# Patient Record
Sex: FEMALE | Race: WHITE | Employment: FULL TIME | URBAN - METROPOLITAN AREA
[De-identification: names, ages, dates, MRNs, and addresses within clinical notes are randomized per-mention and may not be internally consistent; named-entity substitution may affect disease eponyms.]

---

## 2017-03-02 ENCOUNTER — HOSPITAL ENCOUNTER (OUTPATIENT)
Dept: MAMMOGRAPHY | Age: 37
Discharge: HOME | End: 2017-03-02
Attending: OBSTETRICS & GYNECOLOGY
Payer: COMMERCIAL

## 2017-03-02 DIAGNOSIS — Z80.3 FAMILY HISTORY OF BREAST CANCER: ICD-10-CM

## 2017-03-02 PROCEDURE — 77067 SCR MAMMO BI INCL CAD: CPT

## 2018-01-02 NOTE — THERAPY EVALUATION
Burgess Moser  : 1980  [facility]  Secondary:  [facility], [address], [address]  [phone]   [phone]          OUTPATIENT PHYSICAL THERAPY:Initial Assessment 1/3/2018    ICD-10: Treatment Diagnosis: Stress incontinence (female) (male) (N39.3)  Precautions/Allergies:   Pcn [penicillins]   Fall Risk Score: 0 (? 5 = High Risk)  MD Orders: eval and treat MEDICAL/REFERRING DIAGNOSIS:  OAB (overactive bladder) [N32.81]   DATE OF ONSET: 2 years  REFERRING PHYSICIAN: Danay Velasquez MD  RETURN PHYSICIAN APPOINTMENT: TBD      INITIAL ASSESSMENT:  Ms. Chelsea Duran 40year old female who has core and pelvic floor mm weakness consistant with stress incontinence. She presents with decreased pelvic floor strength and endurance of the PF mm. Her PERF = 3/5/8/NT. Pt participates in high level activities that require increased strength of the pelvic floor. Pt would benefit from skilled therapy to address these deficits for return to previous level of function. PROBLEM LIST (Impacting functional limitations):  1. Decreased strength of pelvic floor which limits bladder control INTERVENTIONS PLANNED:  1. Neuromuscular re-education  2. Biofeedback as needed  3. HEP  4. Bladder retraining  5. Bladder education  6. Electrical Stimulation  7. Home Exercise Program (HEP)  8. Neuromuscular Re-education/Strengthening  9. Range of Motion (ROM)  10. Therapeutic Activites  11. Therapeutic Exercise/Strengthening   TREATMENT PLAN:  Effective Dates: 2018 TO 2018  Frequency/Duration: 1 time a week for 12 weeks  GOALS: (Goals have been discussed and agreed upon with patient.)  Short-Term Functional Goals: Time Frame: in 3 weeks  1. Patient will demonstrate independence with home exercise program.  2. Patient will demonstrated good coordination of pelvic floor muscles to improve continence.    Discharge Goals: Time Frame: in 12 weeks  Pt will be able to participate in 30 min of aerobic activities without urinary incontinence. Pt will report a 75 % decrease in urinary incontinent episodes per voiding log in order to decrease social anxiety. Pt will demonstrate an increase in PFM endurance from 5 hold to 20 hold x 10 reps as measured by EMG within 6 weeks in order to improve PFM activity and thus decrease incontinence. Patient will demonstrate use of functional brace PFM contraction by performing a \"pelvic brace\" in order to eliminate UI during a jumping isabel  Rehabilitation Potential For Stated Goals: Good  Regarding Lucy Solano's therapy, I certify that the treatment plan above will be carried out by a therapist or under their direction. Thank you for this referral,  Rosa Merino PT     Referring Physician Signature: Dariel Hart MD              Date                    The information in this section was collected on 1/3/2018 (except where otherwise noted). HISTORY:   Present Symptom:  Pain Intensity 1: 0 working out at gym 3 x week. [place] with lifting toddler. History of Present Injury/Illness (Reason for Referral):  Pt states that she is having frequency and   Past Medical History/Comorbidities:   Ms. Alyssa Ferrer  has no past medical history on file. Ms. Alyssa Ferrer  has a past surgical history that includes hx wisdom teeth extraction. 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 Current Medications:    Current Outpatient Prescriptions:     PSEUDOEPHEDRINE-guaiFENesin (MUCINEX D)  mg per tablet, Take 1 Tab by mouth every twelve (12) hours. , Disp: , Rfl:     docusate sodium (COLACE) 50 mg capsule, Take 50 mg by mouth two (2) times a day., Disp: , Rfl:     TAYTULLA 1 mg-20 mcg (24)/75 mg (4) cap, Take 1 Caplet by mouth daily. , Disp: 1 Package, Rfl: 12    oxybutynin chloride XL (DITROPAN XL) 10 mg CR tablet, Take 1 Tab by mouth daily. , Disp: 30 Tab, Rfl: 11    Cetirizine (ZYRTEC) 10 mg cap, Take  by mouth., Disp: , Rfl:    Date Last Reviewed:  1/3/2018  Gynecological History:   · Number of pregnancies: 2, vaginal 2  Son is not two. · Episiotomy: 2x  Past Urinary Medical History:  Cyst on right ovary. Incontinence History:  PROBLEM: YES/NO: COMMENTS:   Loss of urine with coughing YES    Loss of urine with lifting  YES    Loss of urine with exercise, running YES    Loss of urine with strong urge NO    Loss of urine with approaching the bathroom NO    Loss of urine with key in lock NO    Loss of urine as getting to toilet/removing clothing NO    Loss of urine when hearing running water NO    Have difficulty initiating a urine stream NO    Have difficulty stopping urine stream YES    Have to strain to empty bladder NO    Dribble urine when urinating NO    Dribble urine after emptying bladder NO    Experience pain with urination NO    Experience burning during urination NO    Have blood in urine NO      Voiding Patterns:  Patient voids every 1 hours during the day and 2-3 times during the night. Patient reports that she empties bladder. Patient uses pads for bladder protection; she changes pads 0 times per day. Fluid Intake:  Patient drinks  60 oz of water per day. She consumes 20 oz of caffeinated beverages per day. Patient not limit fluid intake to control bladder. Bowel Habits:  Patient feels like she has an anal fissure. Has to take stool softener and Mirilax. Feels like she has a bowel movement daily or every other day. Mobility / Self Care: I   Personal / Social History:  · Sexually active? NO:   · Social activities restricted due to urinary incontinence? YES: Playing with children.        Number of Personal Factors/Comorbidities that affect the Plan of Care: 1-2: MODERATE COMPLEXITY   EXAMINATION:   External Observation:   · Voluntary Contraction: present Pt demonstrates increased use of accessory mm with pelvic floor contraction. · Voluntary Relaxation: present   · Involuntary Contraction: present  · Involuntary Relaxation: present  · Perineal Body Assessment: WNL  · Anal Dyer: present B  · Skin Integrity: WNL  · Q-tip Test: WNL  · Vaginal Vault Size: within normal limits    L[place]ock PERFECT (Power/Endurnace/Repetitions/Fast Twitch/Elevation/Co-contraction/Timing) Scale:   · Lacock PERFECT = 3/5/8/NT. · Tissue support test with bearing down:  Grade 1: not visible at introitus; Grade 2: visible at introitus; Grade 3: tissue outside introitus  · Anterior Wall = 1   · Posterior Wall = 0   · Apical = 0   · Palpation:  No pain with palpation         Body Structures Involved:  1. Muscles Body Functions Affected:  1. Genitourinary  2. Reproductive  3. Neuromusculoskeletal Activities and Participation Affected:  1. Self Care  2. Interpersonal Interactions and Relationships  3. Community, Social and Leon Corin[place]   Number of elements that affect the Plan of Care: 3: MODERATE COMPLEXITY   CLINICAL PRESENTATION:   Presentation: Evolving clinical presentation with changing clinical characteristics: MODERATE COMPLEXITY   CLINICAL DECISION MAKING:   Outcome Measure: Tool Used: Pelvic Floor Impact Questionnaire--short form 7 (PFIQ-7)   Score:  Initial:   · Bladder or Urine: 12  · Bowel or Rectum: 6  · Vagina or Pelvis: 4 Most Recent: X (Date: -- )  · Bladder or Urine: X  · Bowel or Rectum: X  · Vagina or Pelvis: X   Interpretation of Score: Each of the 7 sections is scored on a scale from 0-3; 0 representing \"Not at all\", 3 representing \"Quite a bit\". The mean value is taken from all the answered items, then multiplied by 100 to obtain the scale score, ranging from 0-100. This process is repeated for each column representing bowel, bladder, and pelvic pain. Medical Necessity:   · Patient is expected to demonstrate progress in coordination to decrease assistance required with Kegel.   · Patient demonstrates good rehab potential due to higher previous functional level. Reason for Services/Other Comments:  · Patient continues to require skilled intervention due to incontinence. Use of outcome tool(s) and clinical judgement create a POC that gives a: Questionable prediction of patient's progress: MODERATE COMPLEXITY   TREATMENT:   (In addition to Assessment/Re-Assessment sessions the following treatments were rendered)  Pre-treatment Symptoms/Complaints:  Stress incontinence  Pain: Initial:   Pain Intensity 1: 0 0/10 Post Session:  0/10     THERAPEUTIC EXERCISE: (20 minutes):  Exercises per grid below to improve mobility and coordination. Required minimal verbal and tactile cues to promote proper body breathing techniques. Progressed complexity of movement as indicated. Exercises:  Patient instructed in pelvic floor exercises listed below:   Date:  1/2/2018 Date:   Date:     Activity/Exercise Parameters Parameters Parameters   Pt education  10 min      Kegel in supine with tactile feedback    5on10 off x 10  1on1 off x 10        Small jumping jacks with kegel X 10                                   [employer] Portal    The following educational topics were reviewed with patient:  Bladder health,pelvic floor anatomy, how foods affect bladder, bladder retraining. Treatment/Session Assessment:    · Response to Treatment:   Pt reported good understanding of plan of care as well as exercises. All questions were answered and pt. Invited to call with any further questions or issues. · Compliance with Program/Exercises: Will assess as treatment progresses. · Recommendations/Intent for next treatment session: \"Next visit will focus on advancements to more challenging activities\".   Total Treatment Duration:  PT Patient Time In/Time Out  Time In: 0900  Time Out: 1000    Susy Hooks, PT

## 2018-01-03 ENCOUNTER — HOSPITAL ENCOUNTER (OUTPATIENT)
Dept: PHYSICAL THERAPY | Age: 38
Discharge: HOME | End: 2018-01-03
Attending: OBSTETRICS & GYNECOLOGY
Payer: COMMERCIAL

## 2018-01-03 DIAGNOSIS — N32.81 OAB (OVERACTIVE BLADDER): ICD-10-CM

## 2018-01-03 PROCEDURE — 97110 THERAPEUTIC EXERCISES: CPT

## 2018-01-03 PROCEDURE — 97161 PT EVAL LOW COMPLEX 20 MIN: CPT

## 2018-01-03 NOTE — PROGRESS NOTES
Ambulatory/Rehab Services H2 Model Falls Risk Assessment    Risk Factor Pts. ·   Confusion/Disorientation/Impulsivity  []    4 ·   Symptomatic Depression  []   2 ·   Altered Elimination  []   1 ·   Dizziness/Vertigo  []   1 ·   Gender (Male)  []   1 ·   Any administered antiepileptics (anticonvulsants):  []   2 ·   Any administered benzodiazepines:  []   1 ·   Visual Impairment (specify):  []   1 ·   Portable Oxygen Use  []   1 ·   Orthostatic ? BP  []   1 ·   History of Recent Falls (within 3 mos.)  []   5     Ability to Rise from Chair (choose one) Pts. ·   Ability to rise in a single movement  [x]   0 ·   Pushes up, successful in one attempt  []   1 ·   Multiple attempts, but successful  []   3 ·   Unable to rise without assistance  []   4   Total: (5 or greater = High Risk) 0     Falls Prevention Plan:   []                Physical Limitations to Exercise (specify):   []                Mobility Assistance Device (type):   []                Exercise/Equipment Adaptation (specify):    ©2010 [facility] [address] [place] [address] Patent #[id number].  Federal Law prohibits the replication, distribution or use without written permission from [facility] [employer]

## 2018-01-11 ENCOUNTER — HOSPITAL ENCOUNTER (OUTPATIENT)
Dept: PHYSICAL THERAPY | Age: 38
Discharge: HOME | End: 2018-01-11
Attending: OBSTETRICS & GYNECOLOGY
Payer: COMMERCIAL

## 2018-01-11 PROCEDURE — 97140 MANUAL THERAPY 1/> REGIONS: CPT

## 2018-01-11 PROCEDURE — 97110 THERAPEUTIC EXERCISES: CPT

## 2018-01-11 NOTE — PROGRESS NOTES
Tabitha Mort  : 1980  [facility]  Secondary:  [facility], [address] [address], [address] [facility]  [phone]   [phone]          OUTPATIENT PHYSICAL THERAPY:Daily Note 2018    ICD-10: Treatment Diagnosis: Stress incontinence (female) (male) (N39.3)  Precautions/Allergies:   Pcn [penicillins]   Fall Risk Score: 0 (? 5 = High Risk)  MD Orders: eval and treat MEDICAL/REFERRING DIAGNOSIS:  Overactive bladder [N32.81]   DATE OF ONSET: 2 years  REFERRING PHYSICIAN: Kiran Altamirano MD  RETURN PHYSICIAN APPOINTMENT: TBD       ASSESSMENT:  Ms. Jamey Queen 40year old female who has core and pelvic floor mm weakness consistant with stress incontinence. She presents with decreased pelvic floor strength and endurance of the PF mm. Her PERF = 3/5/8/NT. Pt participates in high level activities that require increased strength of the pelvic floor. Pt would benefit from skilled therapy to address these deficits for return to previous level of function. PROBLEM LIST (Impacting functional limitations):  1. Decreased strength of pelvic floor which limits bladder control INTERVENTIONS PLANNED:  1. Neuromuscular re-education  2. Biofeedback as needed  3. HEP  4. Bladder retraining  5. Bladder education  6. Electrical Stimulation  7. Home Exercise Program (HEP)  8. Neuromuscular Re-education/Strengthening  9. Range of Motion (ROM)  10. Therapeutic Activites  11. Therapeutic Exercise/Strengthening   TREATMENT PLAN:  Effective Dates: 2018 TO 2018  Frequency/Duration: 1 time a week for 12 weeks  GOALS: (Goals have been discussed and agreed upon with patient.)  Short-Term Functional Goals: Time Frame: in 3 weeks  1. Patient will demonstrate independence with home exercise program.  2. Patient will demonstrated good coordination of pelvic floor muscles to improve continence.    Discharge Goals: Time Frame: in 12 weeks  Pt will be able to participate in 30 min of aerobic activities without urinary incontinence. Pt will report a 75 % decrease in urinary incontinent episodes per voiding log in order to decrease social anxiety. Pt will demonstrate an increase in PFM endurance from 5 hold to 20 hold x 10 reps as measured by EMG within 6 weeks in order to improve PFM activity and thus decrease incontinence. Patient will demonstrate use of functional brace PFM contraction by performing a \"pelvic brace\" in order to eliminate UI during a jumping isabel  Rehabilitation Potential For Stated Goals: Good  Regarding Lucy Solano's therapy, I certify that the treatment plan above will be carried out by a therapist or under their direction. Thank you for this referral,  Jersey Mak, PT     Referring Physician Signature: Dom Angulo MD              Date                    The information in this section was collected on 1/3/2018 (except where otherwise noted). HISTORY:   Present Symptom:  Pain Intensity 1: 0 working out at gym 3 x week. [place] with lifting toddler. History of Present Injury/Illness (Reason for Referral):  Pt states that she is having frequency and   Past Medical History/Comorbidities:   Ms. Moses Flores  has no past medical history on file. Ms. Moses Flores  has a past surgical history that includes hx wisdom teeth extraction. 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 Current Medications:    Current Outpatient Prescriptions:     PSEUDOEPHEDRINE-guaiFENesin (MUCINEX D)  mg per tablet, Take 1 Tab by mouth every twelve (12) hours. , Disp: , Rfl:     docusate sodium (COLACE) 50 mg capsule, Take 50 mg by mouth two (2) times a day., Disp: , Rfl:     TAYTULLA 1 mg-20 mcg (24)/75 mg (4) cap, Take 1 Caplet by mouth daily. , Disp: 1 Package, Rfl: 12    oxybutynin chloride XL (DITROPAN XL) 10 mg CR tablet, Take 1 Tab by mouth daily. , Disp: 30 Tab, Rfl: 11    Cetirizine (ZYRTEC) 10 mg cap, Take  by mouth., Disp: , Rfl:    Date Last Reviewed:  1/11/2018  Gynecological History:   · Number of pregnancies: 2, vaginal 2  Son is not two. · Episiotomy: 2x  Past Urinary Medical History:  Cyst on right ovary. Incontinence History:  PROBLEM: YES/NO: COMMENTS:   Loss of urine with coughing YES    Loss of urine with lifting  YES    Loss of urine with exercise, running YES    Loss of urine with strong urge NO    Loss of urine with approaching the bathroom NO    Loss of urine with key in lock NO    Loss of urine as getting to toilet/removing clothing NO    Loss of urine when hearing running water NO    Have difficulty initiating a urine stream NO    Have difficulty stopping urine stream YES    Have to strain to empty bladder NO    Dribble urine when urinating NO    Dribble urine after emptying bladder NO    Experience pain with urination NO    Experience burning during urination NO    Have blood in urine NO      Voiding Patterns:  Patient voids every 1 hours during the day and 2-3 times during the night. Patient reports that she empties bladder. Patient uses pads for bladder protection; she changes pads 0 times per day. Fluid Intake:  Patient drinks  60 oz of water per day. She consumes 20 oz of caffeinated beverages per day. Patient not limit fluid intake to control bladder. Bowel Habits:  Patient feels like she has an anal fissure. Has to take stool softener and Mirilax. Feels like she has a bowel movement daily or every other day. Mobility / Self Care: I   Personal / Social History:  · Sexually active? NO:   · Social activities restricted due to urinary incontinence? YES: Playing with children.        Number of Personal Factors/Comorbidities that affect the Plan of Care: 1-2: MODERATE COMPLEXITY   EXAMINATION:   External Observation:   · Voluntary Contraction: present Pt demonstrates increased use of accessory mm with pelvic floor contraction. · Voluntary Relaxation: present   · Involuntary Contraction: present  · Involuntary Relaxation: present  · Perineal Body Assessment: WNL  · Anal Boelus: present B  · Skin Integrity: WNL  · Q-tip Test: WNL  · Vaginal Vault Size: within normal limits    Lacock PERFECT (Power/Endurnace/Repetitions/Fast Twitch/Elevation/Co-contraction/Timing) Scale:   · [place]ock PERFECT = 3/5/8/NT. · Tissue support test with bearing down:  Grade 1: not visible at introitus; Grade 2: visible at introitus; Grade 3: tissue outside introitus  · Anterior Wall = 1   · Posterior Wall = 0   · Apical = 0   · Palpation:  No pain with palpation. 1/11/2018  Increased tone left LA and OI  SEMG evaluation:   Date: 1/11/2018    Resting Tone: 10 uV   Quality of Resting Tone: elevated   5 Second Hold: 15 uV   10 Second Hold: 15 uV   Quality of Recruitment: Fair    Quality of Relaxation: Poor    Quality of Holding: Fair    Stability of Hold: Fair    Stability of Rest: Poor   ·         Body Structures Involved:  1. Muscles Body Functions Affected:  1. Genitourinary  2. Reproductive  3. Neuromusculoskeletal Activities and Participation Affected:  1. Self Care  2. Interpersonal Interactions and Relationships  3. Community, Social and [place]   Number of elements that affect the Plan of Care: 3: MODERATE COMPLEXITY   CLINICAL PRESENTATION:   Presentation: Evolving clinical presentation with changing clinical characteristics: MODERATE COMPLEXITY   CLINICAL DECISION MAKING:   Outcome Measure: Tool Used: Pelvic Floor Impact Questionnaire--short form 7 (PFIQ-7)   Score:  Initial:   · Bladder or Urine: 12  · Bowel or Rectum: 6  · Vagina or Pelvis: 4 Most Recent: X (Date: -- )  · Bladder or Urine: X  · Bowel or Rectum: X  · Vagina or Pelvis: X   Interpretation of Score: Each of the 7 sections is scored on a scale from 0-3; 0 representing \"Not at all\", 3 representing \"Quite a bit\".  The mean value is taken from all the answered items, then multiplied by 100 to obtain the scale score, ranging from 0-100. This process is repeated for each column representing bowel, bladder, and pelvic pain. Medical Necessity:   · Patient is expected to demonstrate progress in coordination to decrease assistance required with Kegel. · Patient demonstrates good rehab potential due to higher previous functional level. Reason for Services/Other Comments:  · Patient continues to require skilled intervention due to incontinence. Use of outcome tool(s) and clinical judgement create a POC that gives a: Questionable prediction of patient's progress: MODERATE COMPLEXITY   TREATMENT:   (In addition to Assessment/Re-Assessment sessions the following treatments were rendered)  Pre-treatment Symptoms/Complaints:  Pt states that she has been doing her exercises. No change at this time. Pain: Initial:   Pain Intensity 1: 0 0/10 Post Session:  0/10     THERAPEUTIC EXERCISE: (35 minutes):  Exercises per grid below to improve mobility and coordination. Required minimal verbal and tactile cues to promote proper body breathing techniques. Progressed complexity of movement as indicated. MANUAL THERAPY: (15 minutes): Soft tissue mobilization was utilized and necessary because of the patient's restricted motion of soft tissue. SCS and Trigger point to bilateral LA, OI, and perineum to reduce tone and improve tissue elasticity.    Left has increased tightness compared to the right  Exercises:  Patient instructed in pelvic floor exercises listed below:   Date:  1/2/2018 Date:  1/11/2018 Date:     Activity/Exercise Parameters Parameters Parameters   Pt education  10 min      Kegel in supine with tactile feedback    5on10 off x 10  1on1 off x 10    5on10 off x 10  1on1 off x 10     Small jumping jacks with kegel X 10           Kegel in supine with biofeedback assist for visual feedback via vaginal internal sensor    Focus on resting tone and hold contractions 10 min total     NMES with Kegel   50 HZ 5on10 off 6 min       Piriformis stretch  3 x 30 sec    Adductor stretch  3 x 30 sec    Jan stretch  3 x 30 sec          [employer] Portal    The following educational topics were reviewed with patient:  Treatment/Session Assessment:    · Response to Treatment:  Pt continued to have a high resting tone of 10 uV after soft tissue and relaxation techniques. Will continue to work on relaxation techniques and stretching to improve resting tone. · Compliance with Program/Exercises: Will assess as treatment progresses. · Recommendations/Intent for next treatment session: \"Next visit will focus on advancements to more challenging activities\".   Total Treatment Duration:  PT Patient Time In/Time Out  Time In: 0900  Time Out: 1000    Kyra Jimenes PT

## 2018-01-17 ENCOUNTER — HOSPITAL ENCOUNTER (OUTPATIENT)
Dept: PHYSICAL THERAPY | Age: 38
End: 2018-01-17
Attending: OBSTETRICS & GYNECOLOGY
Payer: COMMERCIAL

## 2018-01-18 ENCOUNTER — APPOINTMENT (OUTPATIENT)
Dept: PHYSICAL THERAPY | Age: 38
End: 2018-01-18
Attending: OBSTETRICS & GYNECOLOGY
Payer: COMMERCIAL

## 2018-01-25 ENCOUNTER — HOSPITAL ENCOUNTER (OUTPATIENT)
Dept: PHYSICAL THERAPY | Age: 38
Discharge: HOME | End: 2018-01-25
Attending: OBSTETRICS & GYNECOLOGY
Payer: COMMERCIAL

## 2018-01-25 PROCEDURE — 97140 MANUAL THERAPY 1/> REGIONS: CPT

## 2018-01-25 PROCEDURE — 97110 THERAPEUTIC EXERCISES: CPT

## 2018-01-25 NOTE — PROGRESS NOTES
Betty Johnson  : 1980  [facility]  Secondary:  Therapy Center at [address], [address] [place] [address] [address], [address] [facility]  [phone]   [phone]          OUTPATIENT PHYSICAL THERAPY:Daily Note 2018    ICD-10: Treatment Diagnosis: Stress incontinence (female) (male) (N39.3)  Precautions/Allergies:   Pcn [penicillins]   Fall Risk Score: 0 (? 5 = High Risk)  MD Orders: eval and treat MEDICAL/REFERRING DIAGNOSIS:  Overactive bladder [N32.81]   DATE OF ONSET: 2 years  REFERRING PHYSICIAN: Taylor Jamil MD  RETURN PHYSICIAN APPOINTMENT: TBD       ASSESSMENT:  Ms. Mercy Holliday 40year old female who has core and pelvic floor mm weakness consistant with stress incontinence. She presents with decreased pelvic floor strength and endurance of the PF mm. Her PERF = 3/5/8/NT. Pt participates in high level activities that require increased strength of the pelvic floor. Pt would benefit from skilled therapy to address these deficits for return to previous level of function. PROBLEM LIST (Impacting functional limitations):  1. Decreased strength of pelvic floor which limits bladder control INTERVENTIONS PLANNED:  1. Neuromuscular re-education  2. Biofeedback as needed  3. HEP  4. Bladder retraining  5. Bladder education  6. Electrical Stimulation  7. Home Exercise Program (HEP)  8. Neuromuscular Re-education/Strengthening  9. Range of Motion (ROM)  10. Therapeutic Activites  11. Therapeutic Exercise/Strengthening   TREATMENT PLAN:  Effective Dates: 2018 TO 2018  Frequency/Duration: 1 time a week for 12 weeks  GOALS: (Goals have been discussed and agreed upon with patient.)  Short-Term Functional Goals: Time Frame: in 3 weeks  1. Patient will demonstrate independence with home exercise program.  2. Patient will demonstrated good coordination of pelvic floor muscles to improve continence.    Discharge Goals: Time Frame: in 12 weeks  Pt will be able to participate in 30 min of aerobic activities without urinary incontinence. Pt will report a 75 % decrease in urinary incontinent episodes per voiding log in order to decrease social anxiety. Pt will demonstrate an increase in PFM endurance from 5 hold to 20 hold x 10 reps as measured by EMG within 6 weeks in order to improve PFM activity and thus decrease incontinence. Patient will demonstrate use of functional brace PFM contraction by performing a \"pelvic brace\" in order to eliminate UI during a jumping isabel  Rehabilitation Potential For Stated Goals: Good  Regarding Lucy Solano's therapy, I certify that the treatment plan above will be carried out by a therapist or under their direction. Thank you for this referral,  Lisandro Blank, PT     Referring Physician Signature: Debra Jennings MD              Date                    The information in this section was collected on 1/3/2018 (except where otherwise noted). HISTORY:   Present Symptom:  Pain Intensity 1: 0 working out at gym 3 x week. [place] with lifting toddler. History of Present Injury/Illness (Reason for Referral):  Pt states that she is having frequency and   Past Medical History/Comorbidities:   Ms. Christian Manzano  has no past medical history on file. Ms. Christian Manzano  has a past surgical history that includes hx wisdom teeth extraction. 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 Current Medications:    Current Outpatient Prescriptions:     PSEUDOEPHEDRINE-guaiFENesin (MUCINEX D)  mg per tablet, Take 1 Tab by mouth every twelve (12) hours. , Disp: , Rfl:     docusate sodium (COLACE) 50 mg capsule, Take 50 mg by mouth two (2) times a day., Disp: , Rfl:     TAYTULLA 1 mg-20 mcg (24)/75 mg (4) cap, Take 1 Caplet by mouth daily. , Disp: 1 Package, Rfl: 12    oxybutynin chloride XL (DITROPAN XL) 10 mg CR tablet, Take 1 Tab by mouth daily. , Disp: 30 Tab, Rfl: 11    Cetirizine (ZYRTEC) 10 mg cap, Take  by mouth., Disp: , Rfl:    Date Last Reviewed:  1/25/2018  Gynecological History:   · Number of pregnancies: 2, vaginal 2  Son is not two. · Episiotomy: 2x  Past Urinary Medical History:  Cyst on right ovary. Incontinence History:  PROBLEM: YES/NO: COMMENTS:   Loss of urine with coughing YES    Loss of urine with lifting  YES    Loss of urine with exercise, running YES    Loss of urine with strong urge NO    Loss of urine with approaching the bathroom NO    Loss of urine with key in lock NO    Loss of urine as getting to toilet/removing clothing NO    Loss of urine when hearing running water NO    Have difficulty initiating a urine stream NO    Have difficulty stopping urine stream YES    Have to strain to empty bladder NO    Dribble urine when urinating NO    Dribble urine after emptying bladder NO    Experience pain with urination NO    Experience burning during urination NO    Have blood in urine NO      Voiding Patterns:  Patient voids every 1 hours during the day and 2-3 times during the night. Patient reports that she empties bladder. Patient uses pads for bladder protection; she changes pads 0 times per day. Fluid Intake:  Patient drinks  60 oz of water per day. She consumes 20 oz of caffeinated beverages per day. Patient not limit fluid intake to control bladder. Bowel Habits:  Patient feels like she has an anal fissure. Has to take stool softener and Mirilax. Feels like she has a bowel movement daily or every other day. Mobility / Self Care: I   Personal / Social History:  · Sexually active? NO:   · Social activities restricted due to urinary incontinence? YES: Playing with children.        Number of Personal Factors/Comorbidities that affect the Plan of Care: 1-2: MODERATE COMPLEXITY   EXAMINATION:   External Observation:   · Voluntary Contraction: present Pt demonstrates increased use of accessory mm with pelvic floor contraction. · Voluntary Relaxation: present   · Involuntary Contraction: present  · Involuntary Relaxation: present  · Perineal Body Assessment: WNL  · Anal [place]: present B  · Skin Integrity: WNL  · Q-tip Test: WNL  · Vaginal Vault Size: within normal limits    [place] PERFECT (Power/Endurnace/Repetitions/Fast Twitch/Elevation/Co-contraction/Timing) Scale:   · [place] PERFECT = 3/5/8/NT. · Tissue support test with bearing down:  Grade 1: not visible at introitus; Grade 2: visible at introitus; Grade 3: tissue outside introitus  · Anterior Wall = 1   · Posterior Wall = 0   · Apical = 0   · Palpation:  No pain with palpation. 1/11/2018  Increased tone left LA and OI  SEMG evaluation:   Date: 1/11/2018    Resting Tone: 10 uV   Quality of Resting Tone: elevated   5 Second Hold: 15 uV   10 Second Hold: 15 uV   Quality of Recruitment: Fair    Quality of Relaxation: Poor    Quality of Holding: Fair    Stability of Hold: Fair    Stability of Rest: Poor   ·         Body Structures Involved:  1. Muscles Body Functions Affected:  1. Genitourinary  2. Reproductive  3. Neuromusculoskeletal Activities and Participation Affected:  1. Self Care  2. Interpersonal Interactions and Relationships  3. Community, Social and [place]   Number of elements that affect the Plan of Care: 3: MODERATE COMPLEXITY   CLINICAL PRESENTATION:   Presentation: Evolving clinical presentation with changing clinical characteristics: MODERATE COMPLEXITY   CLINICAL DECISION MAKING:   Outcome Measure: Tool Used: Pelvic Floor Impact Questionnaire--short form 7 (PFIQ-7)   Score:  Initial:   · Bladder or Urine: 12  · Bowel or Rectum: 6  · Vagina or Pelvis: 4 Most Recent: X (Date: -- )  · Bladder or Urine: X  · Bowel or Rectum: X  · Vagina or Pelvis: X   Interpretation of Score: Each of the 7 sections is scored on a scale from 0-3; 0 representing \"Not at all\", 3 representing \"Quite a bit\".  The mean value is taken from all the answered items, then multiplied by 100 to obtain the scale score, ranging from 0-100. This process is repeated for each column representing bowel, bladder, and pelvic pain. Medical Necessity:   · Patient is expected to demonstrate progress in coordination to decrease assistance required with Kegel. · Patient demonstrates good rehab potential due to higher previous functional level. Reason for Services/Other Comments:  · Patient continues to require skilled intervention due to incontinence. Use of outcome tool(s) and clinical judgement create a POC that gives a: Questionable prediction of patient's progress: MODERATE COMPLEXITY   TREATMENT:   (In addition to Assessment/Re-Assessment sessions the following treatments were rendered)  Pre-treatment Symptoms/Complaints:  Pt states that she thinks leaking is a little better. Pain: Initial:   Pain Intensity 1: 0 0/10 Post Session:  0/10     THERAPEUTIC EXERCISE: (40 minutes):  Exercises per grid below to improve mobility and coordination. Required minimal verbal and tactile cues to promote proper body breathing techniques. Progressed complexity of movement as indicated. MANUAL THERAPY: (15 minutes): Soft tissue mobilization was utilized and necessary because of the patient's restricted motion of soft tissue. SCS and Trigger point to bilateral LA, OI, and perineum to reduce tone and improve tissue elasticity.    Left has increased tightness compared to the right  Exercises:  Patient instructed in pelvic floor exercises listed below:   Date:  1/2/2018 Date:  1/11/2018 Date:  1/25/2018     Activity/Exercise Parameters Parameters Parameters   Pt education  10 min      Kegel in supine with tactile feedback    5on10 off x 10  1on1 off x 10    5on10 off x 10  1on1 off x 10  5on10 off x 10  1on1 off x 10    Small jumping jacks with kegel X 10           Kegel in supine with biofeedback assist for visual feedback via vaginal internal sensor    Focus on resting tone and hold contractions 10 min total  Focus on resting tone and hold contractions 10 min total   10 on 10 off x10   NMES with Kegel   50 HZ 5on10 off 6 min    50 HZ 5on10 off 10 min   Piriformis stretch  3 x 30 sec [id number]   Adductor stretch  3 x 30 sec [id number]   Jan stretch  3 x 30 sec [id number]   Side stepping with ball toss RTB   40 feet x 8   Lift floor to waist   X 5 23#   Bosu   Forward x 30sec  Sideways x [id number]               [employer] Portal    The following educational topics were reviewed with patient:  Treatment/Session Assessment:    · Response to Treatment:  Pt continues to have increased resting tone. Did well with all exercises. · Compliance with Program/Exercises: Will assess as treatment progresses. · Recommendations/Intent for next treatment session: \"Next visit will focus on advancements to more challenging activities\".   Total Treatment Duration:  PT Patient Time In/Time Out  Time In: 0900  Time Out: 1000    Keon Mccoy PT

## 2018-02-13 ENCOUNTER — APPOINTMENT (OUTPATIENT)
Dept: PHYSICAL THERAPY | Age: 38
End: 2018-02-13
Attending: OBSTETRICS & GYNECOLOGY
Payer: COMMERCIAL

## 2018-02-20 ENCOUNTER — HOSPITAL ENCOUNTER (OUTPATIENT)
Dept: PHYSICAL THERAPY | Age: 38
Discharge: HOME | End: 2018-02-20
Attending: OBSTETRICS & GYNECOLOGY
Payer: COMMERCIAL

## 2018-02-20 PROCEDURE — 97110 THERAPEUTIC EXERCISES: CPT

## 2018-02-20 NOTE — PROGRESS NOTES
Piero Florida  : 1980  [facility]  Secondary:  Therapy Center at [address] [place] [address] [facility] [address], [address] [facility]  [phone]   [phone]          OUTPATIENT PHYSICAL THERAPY:Daily Note 2018    ICD-10: Treatment Diagnosis: Stress incontinence (female) (male) (N39.3)  Precautions/Allergies:   Pcn [penicillins]   Fall Risk Score: 0 (? 5 = High Risk)  MD Orders: eval and treat MEDICAL/REFERRING DIAGNOSIS:  Overactive bladder [N32.81]   DATE OF ONSET: 2 years  REFERRING PHYSICIAN: Michael Toro MD  RETURN PHYSICIAN APPOINTMENT: TBD       ASSESSMENT:  Ms. Boo Sargent 40year old female who has core and pelvic floor mm weakness consistant with stress incontinence. She presents with decreased pelvic floor strength and endurance of the PF mm. Her PERF = 3/5/8/NT. Pt participates in high level activities that require increased strength of the pelvic floor. Pt would benefit from skilled therapy to address these deficits for return to previous level of function. PROBLEM LIST (Impacting functional limitations):  1. Decreased strength of pelvic floor which limits bladder control INTERVENTIONS PLANNED:  1. Neuromuscular re-education  2. Biofeedback as needed  3. HEP  4. Bladder retraining  5. Bladder education  6. Electrical Stimulation  7. Home Exercise Program (HEP)  8. Neuromuscular Re-education/Strengthening  9. Range of Motion (ROM)  10. Therapeutic Activites  11. Therapeutic Exercise/Strengthening   TREATMENT PLAN:  Effective Dates: 2018 TO 2018  Frequency/Duration: 1 time a week for 12 weeks  GOALS: (Goals have been discussed and agreed upon with patient.)  Short-Term Functional Goals: Time Frame: in 3 weeks  1. Patient will demonstrate independence with home exercise program.  2. Patient will demonstrated good coordination of pelvic floor muscles to improve continence.    Discharge Goals: Time Frame: in 12 weeks  Pt will be able to participate in 30 min of aerobic activities without urinary incontinence. Pt will report a 75 % decrease in urinary incontinent episodes per voiding log in order to decrease social anxiety. Pt will demonstrate an increase in PFM endurance from 5 hold to 20 hold x 10 reps as measured by EMG within 6 weeks in order to improve PFM activity and thus decrease incontinence. Patient will demonstrate use of functional brace PFM contraction by performing a \"pelvic brace\" in order to eliminate UI during a jumping isabel  Rehabilitation Potential For Stated Goals: Good  Regarding Lucy Solano's therapy, I certify that the treatment plan above will be carried out by a therapist or under their direction. Thank you for this referral,  Jersey Mak, PT     Referring Physician Signature: Dom Angulo MD              Date                    The information in this section was collected on 1/3/2018 (except where otherwise noted). HISTORY:   Present Symptom:  Pain Intensity 1: 0 working out at gym 3 x week. [place] with lifting toddler. History of Present Injury/Illness (Reason for Referral):  Pt states that she is having frequency and   Past Medical History/Comorbidities:   Ms. Moses Flores  has no past medical history on file. Ms. Moses Flores  has a past surgical history that includes hx wisdom teeth extraction. 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 Current Medications:    Current Outpatient Prescriptions:     PSEUDOEPHEDRINE-guaiFENesin (MUCINEX D)  mg per tablet, Take 1 Tab by mouth every twelve (12) hours. , Disp: , Rfl:     docusate sodium (COLACE) 50 mg capsule, Take 50 mg by mouth two (2) times a day., Disp: , Rfl:     TAYTULLA 1 mg-20 mcg (24)/75 mg (4) cap, Take 1 Caplet by mouth daily. , Disp: 1 Package, Rfl: 12    oxybutynin chloride XL (DITROPAN XL) 10 mg CR tablet, Take 1 Tab by mouth daily. , Disp: 30 Tab, Rfl: 11    Cetirizine (ZYRTEC) 10 mg cap, Take  by mouth., Disp: , Rfl:    Date Last Reviewed:  2/20/2018  Gynecological History:   · Number of pregnancies: 2, vaginal 2  Son is not two. · Episiotomy: 2x  Past Urinary Medical History:  Cyst on right ovary. Incontinence History:  PROBLEM: YES/NO: COMMENTS:   Loss of urine with coughing YES    Loss of urine with lifting  YES    Loss of urine with exercise, running YES    Loss of urine with strong urge NO    Loss of urine with approaching the bathroom NO    Loss of urine with key in lock NO    Loss of urine as getting to toilet/removing clothing NO    Loss of urine when hearing running water NO    Have difficulty initiating a urine stream NO    Have difficulty stopping urine stream YES    Have to strain to empty bladder NO    Dribble urine when urinating NO    Dribble urine after emptying bladder NO    Experience pain with urination NO    Experience burning during urination NO    Have blood in urine NO      Voiding Patterns:  Patient voids every 1 hours during the day and 2-3 times during the night. Patient reports that she empties bladder. Patient uses pads for bladder protection; she changes pads 0 times per day. Fluid Intake:  Patient drinks  60 oz of water per day. She consumes 20 oz of caffeinated beverages per day. Patient not limit fluid intake to control bladder. Bowel Habits:  Patient feels like she has an anal fissure. Has to take stool softener and Mirilax. Feels like she has a bowel movement daily or every other day. Mobility / Self Care: I   Personal / Social History:  · Sexually active? NO:   · Social activities restricted due to urinary incontinence? YES: Playing with children.        Number of Personal Factors/Comorbidities that affect the Plan of Care: 1-2: MODERATE COMPLEXITY   EXAMINATION:   External Observation:   · Voluntary Contraction: present Pt demonstrates increased use of accessory mm with pelvic floor contraction. · Voluntary Relaxation: present   · Involuntary Contraction: present  · Involuntary Relaxation: present  · Perineal Body Assessment: WNL  · Anal [place]: present B  · Skin Integrity: WNL  · Q-tip Test: WNL  · Vaginal Vault Size: within normal limits    [place] PERFECT (Power/Endurnace/Repetitions/Fast Twitch/Elevation/Co-contraction/Timing) Scale:   · [place] PERFECT = 3/5/8/NT. · Tissue support test with bearing down:  Grade 1: not visible at introitus; Grade 2: visible at introitus; Grade 3: tissue outside introitus  · Anterior Wall = 1   · Posterior Wall = 0   · Apical = 0   · Palpation:  No pain with palpation. 1/11/2018  Increased tone left LA and OI  SEMG evaluation:   Date: 1/11/2018    Resting Tone: 10 uV   Quality of Resting Tone: elevated   5 Second Hold: 15 uV   10 Second Hold: 15 uV   Quality of Recruitment: Fair    Quality of Relaxation: Poor    Quality of Holding: Fair    Stability of Hold: Fair    Stability of Rest: Poor   ·         Body Structures Involved:  1. Muscles Body Functions Affected:  1. Genitourinary  2. Reproductive  3. Neuromusculoskeletal Activities and Participation Affected:  1. Self Care  2. Interpersonal Interactions and Relationships  3. Community, Social and [place]   Number of elements that affect the Plan of Care: 3: MODERATE COMPLEXITY   CLINICAL PRESENTATION:   Presentation: Evolving clinical presentation with changing clinical characteristics: MODERATE COMPLEXITY   CLINICAL DECISION MAKING:   Outcome Measure: Tool Used: Pelvic Floor Impact Questionnaire--short form 7 (PFIQ-7)   Score:  Initial:   · Bladder or Urine: 12  · Bowel or Rectum: 6  · Vagina or Pelvis: 4 Most Recent: X (Date: -- )  · Bladder or Urine: X  · Bowel or Rectum: X  · Vagina or Pelvis: X   Interpretation of Score: Each of the 7 sections is scored on a scale from 0-3; 0 representing \"Not at all\", 3 representing \"Quite a bit\".  The mean value is taken from all the answered items, then multiplied by 100 to obtain the scale score, ranging from 0-100. This process is repeated for each column representing bowel, bladder, and pelvic pain. Medical Necessity:   · Patient is expected to demonstrate progress in coordination to decrease assistance required with Kegel. · Patient demonstrates good rehab potential due to higher previous functional level. Reason for Services/Other Comments:  · Patient continues to require skilled intervention due to incontinence. Use of outcome tool(s) and clinical judgement create a POC that gives a: Questionable prediction of patient's progress: MODERATE COMPLEXITY   TREATMENT:   (In addition to Assessment/Re-Assessment sessions the following treatments were rendered)  Pre-treatment Symptoms/Complaints: The leaking is better. Leaking a lot less with exercises. Has sneezed and not leaked. Pain: Initial:   Pain Intensity 1: 0 0/10 Post Session:  0/10     THERAPEUTIC EXERCISE: (45 minutes):  Exercises per grid below to improve mobility and coordination. Required minimal verbal and tactile cues to promote proper body breathing techniques. Progressed complexity of movement as indicated. MANUAL THERAPY: (0 minutes): Soft tissue mobilization was utilized and necessary because of the patient's restricted motion of soft tissue. SCS and Trigger point to bilateral LA, OI, and perineum to reduce tone and improve tissue elasticity.    Left has increased tightness compared to the right  Exercises:  Patient instructed in pelvic floor exercises listed below:   Date:  1/2/2018 Date:  1/11/2018 Date:  1/25/2018   Date:  2/20/2018   Activity/Exercise Parameters Parameters Parameters    Pt education  10 min       Kegel in supine with tactile feedback    5on10 off x 10  1on1 off x 10    5on10 off x 10  1on1 off x 10  5on10 off x 10  1on1 off x 10  5on10 off x 10  1on1 off x 10    Small jumping jacks with kegel X 10             Kegel in supine with biofeedback assist for visual feedback via vaginal internal sensor    Focus on resting tone and hold contractions 10 min total  Focus on resting tone and hold contractions 10 min total   10 on 10 off x10 Focus on resting tone and hold contractions 10 min total   10 on 10 off x10   NMES with Kegel   50 HZ 5on10 off 6 min    50 HZ 5on10 off 10 min 50 HZ 5on10 off 10 min   Piriformis stretch  3 x 30 sec [id number]    Adductor stretch  3 x 30 sec [id number]    Jan stretch  3 x 30 sec [id number]    Side stepping with ball toss RTB   40 feet x 8    Lift floor to waist   X 5 23#    Bosu   Forward x 30sec  Sideways x [id number] Forward x 30sec  Sideways x [id number]   Jumping isabel     X 5   X 5 with arms   Quick feet    20 sec  On step 20 secx3   Jump rope    x10 sec x 3  20 sec x 2         [employer] Portal    The following educational topics were reviewed with patient:  Treatment/Session Assessment:    · Response to Treatment:  Pt did well with all exercises today. Still continues to have high resting tone with biofeedback. Did have a small leak after exercising today. · Compliance with Program/Exercises: Will assess as treatment progresses. · Recommendations/Intent for next treatment session: \"Next visit will focus on advancements to more challenging activities\".   Total Treatment Duration:  PT Patient Time In/Time Out  Time In: 1000  Time Out: 1050    Luis Angel Arredondo, PT

## 2018-02-27 ENCOUNTER — HOSPITAL ENCOUNTER (OUTPATIENT)
Dept: PHYSICAL THERAPY | Age: 38
End: 2018-02-27
Attending: OBSTETRICS & GYNECOLOGY
Payer: COMMERCIAL

## 2018-03-07 ENCOUNTER — APPOINTMENT (OUTPATIENT)
Dept: PHYSICAL THERAPY | Age: 38
End: 2018-03-07
Attending: OBSTETRICS & GYNECOLOGY
Payer: COMMERCIAL

## 2018-03-08 ENCOUNTER — HOSPITAL ENCOUNTER (OUTPATIENT)
Dept: PHYSICAL THERAPY | Age: 38
Discharge: HOME | End: 2018-03-08
Attending: OBSTETRICS & GYNECOLOGY
Payer: COMMERCIAL

## 2018-03-08 PROCEDURE — 97110 THERAPEUTIC EXERCISES: CPT

## 2018-03-08 NOTE — PROGRESS NOTES
Mart Cruz  : 1980  [facility]  Secondary:  Therapy Center at [facility]  [address] [facility], [address] [place] [address],[address], [address]  [phone]   [phone]          OUTPATIENT PHYSICAL THERAPY:Daily Note 3/8/2018    ICD-10: Treatment Diagnosis: Stress incontinence (female) (male) (N39.3)  Precautions/Allergies:   Pcn [penicillins]   Fall Risk Score: 0 (? 5 = High Risk)  MD Orders: eval and treat MEDICAL/REFERRING DIAGNOSIS:  Overactive bladder [N32.81]   DATE OF ONSET: 2 years  REFERRING PHYSICIAN: Lukasz Orozco MD  RETURN PHYSICIAN APPOINTMENT: TBD       ASSESSMENT:  Ms. [employer] 40year old female who has core and pelvic floor mm weakness consistant with stress incontinence. She presents with decreased pelvic floor strength and endurance of the PF mm. Her PERF = 3/5/8/NT. Pt participates in high level activities that require increased strength of the pelvic floor. Pt would benefit from skilled therapy to address these deficits for return to previous level of function. PROBLEM LIST (Impacting functional limitations):  1. Decreased strength of pelvic floor which limits bladder control INTERVENTIONS PLANNED:  1. Neuromuscular re-education  2. Biofeedback as needed  3. HEP  4. Bladder retraining  5. Bladder education  6. Electrical Stimulation  7. Home Exercise Program (HEP)  8. Neuromuscular Re-education/Strengthening  9. Range of Motion (ROM)  10. Therapeutic Activites  11. Therapeutic Exercise/Strengthening   TREATMENT PLAN:  Effective Dates: 2018 TO 2018  Frequency/Duration: 1 time a week for 12 weeks  GOALS: (Goals have been discussed and agreed upon with patient.)  Short-Term Functional Goals: Time Frame: in 3 weeks  1. Patient will demonstrate independence with home exercise program.  2. Patient will demonstrated good coordination of pelvic floor muscles to improve continence.    Discharge Goals: Time Frame: in 12 weeks  Pt will be able to participate in 30 min of aerobic activities without urinary incontinence. Pt will report a 75 % decrease in urinary incontinent episodes per voiding log in order to decrease social anxiety. Pt will demonstrate an increase in PFM endurance from 5 hold to 20 hold x 10 reps as measured by EMG within 6 weeks in order to improve PFM activity and thus decrease incontinence. Patient will demonstrate use of functional brace PFM contraction by performing a \"pelvic brace\" in order to eliminate UI during a jumping isabel  Rehabilitation Potential For Stated Goals: Good  Regarding Lucy Solano's therapy, I certify that the treatment plan above will be carried out by a therapist or under their direction. Thank you for this referral,  Aleshia Griffin PT     Referring Physician Signature: Sharon Golden MD              Date                    The information in this section was collected on 1/3/2018 (except where otherwise noted). HISTORY:   Present Symptom:  Pain Intensity 1: 0 working out at gym 3 x week. [place]ks with lifting toddler. History of Present Injury/Illness (Reason for Referral):  Pt states that she is having frequency and   Past Medical History/Comorbidities:   Ms. Patricia Meraz  has no past medical history on file. Ms. Patricia Meraz  has a past surgical history that includes hx wisdom teeth extraction. 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 Current Medications:    Current Outpatient Prescriptions:     norethindrone-ethinyl estradiol-iron (LOESTRIN FE 1.5/30, 28-DAY,) 1.5 mg-30 mcg (21)/75 mg (7) tab, Take 1 Tab by mouth daily. , Disp: 1 Dose Pack, Rfl: 8    PSEUDOEPHEDRINE-guaiFENesin (MUCINEX D)  mg per tablet, Take 1 Tab by mouth every twelve (12) hours. , Disp: , Rfl:     docusate sodium (COLACE) 50 mg capsule, Take 50 mg by mouth two (2) times a day., Disp: , Rfl:     oxybutynin chloride XL (DITROPAN XL) 10 mg CR tablet, Take 1 Tab by mouth daily. , Disp: 30 Tab, Rfl: 11    Cetirizine (ZYRTEC) 10 mg cap, Take  by mouth., Disp: , Rfl:    Date Last Reviewed:  3/8/2018  Gynecological History:   · Number of pregnancies: 2, vaginal 2  Son is not two. · Episiotomy: 2x  Past Urinary Medical History:  Cyst on right ovary. Incontinence History:  PROBLEM: YES/NO: COMMENTS:   Loss of urine with coughing YES    Loss of urine with lifting  YES    Loss of urine with exercise, running YES    Loss of urine with strong urge NO    Loss of urine with approaching the bathroom NO    Loss of urine with key in lock NO    Loss of urine as getting to toilet/removing clothing NO    Loss of urine when hearing running water NO    Have difficulty initiating a urine stream NO    Have difficulty stopping urine stream YES    Have to strain to empty bladder NO    Dribble urine when urinating NO    Dribble urine after emptying bladder NO    Experience pain with urination NO    Experience burning during urination NO    Have blood in urine NO      Voiding Patterns:  Patient voids every 1 hours during the day and 2-3 times during the night. Patient reports that she empties bladder. Patient uses pads for bladder protection; she changes pads 0 times per day. Fluid Intake:  Patient drinks  60 oz of water per day. She consumes 20 oz of caffeinated beverages per day. Patient not limit fluid intake to control bladder. Bowel Habits:  Patient feels like she has an anal fissure. Has to take stool softener and Mirilax. Feels like she has a bowel movement daily or every other day. Mobility / Self Care: I   Personal / Social History:  · Sexually active? NO:   · Social activities restricted due to urinary incontinence? YES: Playing with children.        Number of Personal Factors/Comorbidities that affect the Plan of Care: 1-2: MODERATE COMPLEXITY   EXAMINATION:   External Observation:   · Voluntary Contraction: present Pt demonstrates increased use of accessory mm with pelvic floor contraction. · Voluntary Relaxation: present   · Involuntary Contraction: present  · Involuntary Relaxation: present  · Perineal Body Assessment: WNL  · Anal [place]: present B  · Skin Integrity: WNL  · Q-tip Test: WNL  · Vaginal Vault Size: within normal limits    [place] PERFECT (Power/Endurnace/Repetitions/Fast Twitch/Elevation/Co-contraction/Timing) Scale:   · [place] PERFECT = 3/5/8/NT. · Tissue support test with bearing down:  Grade 1: not visible at introitus; Grade 2: visible at introitus; Grade 3: tissue outside introitus  · Anterior Wall = 1   · Posterior Wall = 0   · Apical = 0   · Palpation:  No pain with palpation. 1/11/2018  Increased tone left LA and OI  SEMG evaluation:   Date: 1/11/2018    Resting Tone: 10 uV   Quality of Resting Tone: elevated   5 Second Hold: 15 uV   10 Second Hold: 15 uV   Quality of Recruitment: Fair    Quality of Relaxation: Poor    Quality of Holding: Fair    Stability of Hold: Fair    Stability of Rest: Poor   ·         Body Structures Involved:  1. Muscles Body Functions Affected:  1. Genitourinary  2. Reproductive  3. Neuromusculoskeletal Activities and Participation Affected:  1. Self Care  2. Interpersonal Interactions and Relationships  3. Community, Social and [employer]   Number of elements that affect the Plan of Care: 3: MODERATE COMPLEXITY   CLINICAL PRESENTATION:   Presentation: Evolving clinical presentation with changing clinical characteristics: MODERATE COMPLEXITY   CLINICAL DECISION MAKING:   Outcome Measure:    Tool Used: Pelvic Floor Impact Questionnaire--short form 7 (PFIQ-7)   Score:  Initial:   · Bladder or Urine: 12  · Bowel or Rectum: 6  · Vagina or Pelvis: 4 Most Recent: X (Date: -- )  · Bladder or Urine: X  · Bowel or Rectum: X  · Vagina or Pelvis: X   Interpretation of Score: Each of the 7 sections is scored on a scale from 0-3; 0 representing \"Not at all\", 3 representing \"Quite a bit\". The mean value is taken from all the answered items, then multiplied by 100 to obtain the scale score, ranging from 0-100. This process is repeated for each column representing bowel, bladder, and pelvic pain. Medical Necessity:   · Patient is expected to demonstrate progress in coordination to decrease assistance required with Kegel. · Patient demonstrates good rehab potential due to higher previous functional level. Reason for Services/Other Comments:  · Patient continues to require skilled intervention due to incontinence. Use of outcome tool(s) and clinical judgement create a POC that gives a: Questionable prediction of patient's progress: MODERATE COMPLEXITY   TREATMENT:   (In addition to Assessment/Re-Assessment sessions the following treatments were rendered)  Pre-treatment Symptoms/Complaints:  Chasing son around and had some minimal leaking. Has not had a large. Leaking is about 20% better. Not jumping at the gym. Pain: Initial:   Pain Intensity 1: 0 0/10 Post Session:  0/10     THERAPEUTIC EXERCISE: (45 minutes):  Exercises per grid below to improve mobility and coordination. Required minimal verbal and tactile cues to promote proper body breathing techniques. Progressed complexity of movement as indicated. MANUAL THERAPY: (0 minutes): Soft tissue mobilization was utilized and necessary because of the patient's restricted motion of soft tissue. SCS and Trigger point to bilateral LA, OI, and perineum to reduce tone and improve tissue elasticity.    Left has increased tightness compared to the right  Exercises:  Patient instructed in pelvic floor exercises listed below:   Date:  1/25/2018   Date:  2/20/2018 Date:  3/8/2018   Activity/Exercise Parameters     Pt education       Kegel in supine with tactile feedback    5on10 off x 10  1on1 off x 10  5on10 off x 10  1on1 off x 10  10 hon10 off x 10  1on1 off x 10    Small jumping jacks with kegel            Kegel in supine with biofeedback assist for visual feedback via vaginal internal sensor   Focus on resting tone and hold contractions 10 min total   10 on 10 off x10 Focus on resting tone and hold contractions 10 min total   10 on 10 off x10 Focus on resting tone and hold contractions 10 min total   10 on 10 off x10   NMES with Kegel  50 HZ 5on10 off 10 min 50 HZ 5on10 off 10 min 50 HZ 5on10 off 10 min   Piriformis stretch [id number]     Adductor stretch [id number]     Jan stretch [id number]     Side stepping with ball toss RTB 40 feet x 8     Lift floor to waist X 5 23#     Bosu Forward x 30sec  Sideways x [id number] Forward x 30sec  Sideways x [id number]    Jumping isabel   X 5   X 5 with arms    Quick feet  20 sec  On step 20 secx3    Jump rope  x10 sec x 3  20 sec x 2          [employer] [employer] Portal    The following educational topics were reviewed with patient:  Treatment/Session Assessment:    · Response to Treatment:  Pt did well with all exercises today. Her blood pressure was elevated to 140/95 so her jumping exercises were deferred today. Will continue with more loading exercises next visit. Pt encouraged to continue with monitor BP at home. She will also be seeing her MD on March 22. · Compliance with Program/Exercises: Will assess as treatment progresses. · Recommendations/Intent for next treatment session: \"Next visit will focus on advancements to more challenging activities\".   Total Treatment Duration:  PT Patient Time In/Time Out  Time In: 1000  Time Out: 1100    Mahad Yañez PT

## 2018-03-14 ENCOUNTER — APPOINTMENT (OUTPATIENT)
Dept: PHYSICAL THERAPY | Age: 38
End: 2018-03-14
Attending: OBSTETRICS & GYNECOLOGY
Payer: COMMERCIAL

## 2018-03-21 ENCOUNTER — HOSPITAL ENCOUNTER (OUTPATIENT)
Dept: MAMMOGRAPHY | Age: 38
Discharge: HOME | End: 2018-03-21
Attending: OBSTETRICS & GYNECOLOGY
Payer: COMMERCIAL

## 2018-03-21 ENCOUNTER — HOSPITAL ENCOUNTER (OUTPATIENT)
Dept: PHYSICAL THERAPY | Age: 38
Discharge: HOME | End: 2018-03-21
Attending: OBSTETRICS & GYNECOLOGY
Payer: COMMERCIAL

## 2018-03-21 DIAGNOSIS — Z12.31 VISIT FOR SCREENING MAMMOGRAM: ICD-10-CM

## 2018-03-21 PROCEDURE — 97110 THERAPEUTIC EXERCISES: CPT

## 2018-03-21 PROCEDURE — 97140 MANUAL THERAPY 1/> REGIONS: CPT

## 2018-03-21 PROCEDURE — 77063 BREAST TOMOSYNTHESIS BI: CPT

## 2018-03-21 NOTE — PROGRESS NOTES
Sebastián Baker  : 1980  [facility]  Secondary:  [facility] [address] [address],[address], [address] [facility]  [phone]   [phone]          OUTPATIENT PHYSICAL THERAPY:Daily Note 3/21/2018    ICD-10: Treatment Diagnosis: Stress incontinence (female) (male) (N39.3)  Precautions/Allergies:   Pcn [penicillins]   Fall Risk Score: 0 (? 5 = High Risk)  MD Orders: eval and treat MEDICAL/REFERRING DIAGNOSIS:  Overactive bladder [N32.81]   DATE OF ONSET: 2 years  REFERRING PHYSICIAN: Gen Cruz MD  RETURN PHYSICIAN APPOINTMENT: TBD       ASSESSMENT:  Ms. Christina Gomez 40year old female who has core and pelvic floor mm weakness consistant with stress incontinence. She presents with decreased pelvic floor strength and endurance of the PF mm. Her PERF = 3/5/8/NT. Pt participates in high level activities that require increased strength of the pelvic floor. Pt would benefit from skilled therapy to address these deficits for return to previous level of function. PROBLEM LIST (Impacting functional limitations):  1. Decreased strength of pelvic floor which limits bladder control INTERVENTIONS PLANNED:  1. Neuromuscular re-education  2. Biofeedback as needed  3. HEP  4. Bladder retraining  5. Bladder education  6. Electrical Stimulation  7. Home Exercise Program (HEP)  8. Neuromuscular Re-education/Strengthening  9. Range of Motion (ROM)  10. Therapeutic Activites  11. Therapeutic Exercise/Strengthening   TREATMENT PLAN:  Effective Dates: 2018 TO 2018  Frequency/Duration: 1 time a week for 12 weeks  GOALS: (Goals have been discussed and agreed upon with patient.)  Short-Term Functional Goals: Time Frame: in 3 weeks  1. Patient will demonstrate independence with home exercise program.  2. Patient will demonstrated good coordination of pelvic floor muscles to improve continence.    Discharge Goals: Time Frame: in 12 weeks  Pt will be able to participate in 30 min of aerobic activities without urinary incontinence. Pt will report a 75 % decrease in urinary incontinent episodes per voiding log in order to decrease social anxiety. Pt will demonstrate an increase in PFM endurance from 5 hold to 20 hold x 10 reps as measured by EMG within 6 weeks in order to improve PFM activity and thus decrease incontinence. Patient will demonstrate use of functional brace PFM contraction by performing a \"pelvic brace\" in order to eliminate UI during a jumping isabel  Rehabilitation Potential For Stated Goals: Good  Regarding Lucy Solano's therapy, I certify that the treatment plan above will be carried out by a therapist or under their direction. Thank you for this referral,  Anitha Holcomb, PT     Referring Physician Signature: Hoa Langley MD              Date                    The information in this section was collected on 1/3/2018 (except where otherwise noted). HISTORY:   Present Symptom:  Pain Intensity 1: 0 working out at gym 3 x week. [place]ks with lifting toddler. History of Present Injury/Illness (Reason for Referral):  Pt states that she is having frequency and   Past Medical History/Comorbidities:   Ms. Lorrie Madsen  has no past medical history on file. Ms. Lorrie Madsen  has a past surgical history that includes hx wisdom teeth extraction. 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 Current Medications:    Current Outpatient Prescriptions:     norethindrone-ethinyl estradiol-iron (LOESTRIN FE 1.5/30, 28-DAY,) 1.5 mg-30 mcg (21)/75 mg (7) tab, Take 1 Tab by mouth daily. , Disp: 1 Dose Pack, Rfl: 8    PSEUDOEPHEDRINE-guaiFENesin (MUCINEX D)  mg per tablet, Take 1 Tab by mouth every twelve (12) hours. , Disp: , Rfl:     docusate sodium (COLACE) 50 mg capsule, Take 50 mg by mouth two (2) times a day., Disp: , Rfl:     oxybutynin chloride XL (DITROPAN XL) 10 mg CR tablet, Take 1 Tab by mouth daily. , Disp: 30 Tab, Rfl: 11    Cetirizine (ZYRTEC) 10 mg cap, Take  by mouth., Disp: , Rfl:    Date Last Reviewed:  3/21/2018  Gynecological History:   · Number of pregnancies: 2, vaginal 2  Son is not two. · Episiotomy: 2x  Past Urinary Medical History:  Cyst on right ovary. Incontinence History:  PROBLEM: YES/NO: COMMENTS:   Loss of urine with coughing YES    Loss of urine with lifting  YES    Loss of urine with exercise, running YES    Loss of urine with strong urge NO    Loss of urine with approaching the bathroom NO    Loss of urine with key in lock NO    Loss of urine as getting to toilet/removing clothing NO    Loss of urine when hearing running water NO    Have difficulty initiating a urine stream NO    Have difficulty stopping urine stream YES    Have to strain to empty bladder NO    Dribble urine when urinating NO    Dribble urine after emptying bladder NO    Experience pain with urination NO    Experience burning during urination NO    Have blood in urine NO      Voiding Patterns:  Patient voids every 1 hours during the day and 2-3 times during the night. Patient reports that she empties bladder. Patient uses pads for bladder protection; she changes pads 0 times per day. Fluid Intake:  Patient drinks  60 oz of water per day. She consumes 20 oz of caffeinated beverages per day. Patient not limit fluid intake to control bladder. Bowel Habits:  Patient feels like she has an anal fissure. Has to take stool softener and Mirilax. Feels like she has a bowel movement daily or every other day. Mobility / Self Care: I   Personal / Social History:  · Sexually active? NO:   · Social activities restricted due to urinary incontinence? YES: Playing with children.        Number of Personal Factors/Comorbidities that affect the Plan of Care: 1-2: MODERATE COMPLEXITY   EXAMINATION:   External Observation:   · Voluntary Contraction: present Pt demonstrates increased use of accessory mm with pelvic floor contraction. · Voluntary Relaxation: present   · Involuntary Contraction: present  · Involuntary Relaxation: present  · Perineal Body Assessment: WNL  · Anal [place]: present B  · Skin Integrity: WNL  · Q-tip Test: WNL  · Vaginal Vault Size: within normal limits    [place] PERFECT (Power/Endurnace/Repetitions/Fast Twitch/Elevation/Co-contraction/Timing) Scale:   · [place] PERFECT = 3/5/8/NT. · Tissue support test with bearing down:  Grade 1: not visible at introitus; Grade 2: visible at introitus; Grade 3: tissue outside introitus  · Anterior Wall = 1   · Posterior Wall = 0   · Apical = 0   · Palpation:  No pain with palpation. 1/11/2018  Increased tone left LA and OI  SEMG evaluation:   Date: 1/11/2018    Resting Tone: 10 uV   Quality of Resting Tone: elevated   5 Second Hold: 15 uV   10 Second Hold: 15 uV   Quality of Recruitment: Fair    Quality of Relaxation: Poor    Quality of Holding: Fair    Stability of Hold: Fair    Stability of Rest: Poor   ·         Body Structures Involved:  1. Muscles Body Functions Affected:  1. Genitourinary  2. Reproductive  3. Neuromusculoskeletal Activities and Participation Affected:  1. Self Care  2. Interpersonal Interactions and Relationships  3. Community, Social and [place]   Number of elements that affect the Plan of Care: 3: MODERATE COMPLEXITY   CLINICAL PRESENTATION:   Presentation: Evolving clinical presentation with changing clinical characteristics: MODERATE COMPLEXITY   CLINICAL DECISION MAKING:   Outcome Measure:    Tool Used: Pelvic Floor Impact Questionnaire--short form 7 (PFIQ-7)   Score:  Initial:   · Bladder or Urine: 12  · Bowel or Rectum: 6  · Vagina or Pelvis: 4 Most Recent: X (Date: -- )  · Bladder or Urine: X  · Bowel or Rectum: X  · Vagina or Pelvis: X   Interpretation of Score: Each of the 7 sections is scored on a scale from 0-3; 0 representing \"Not at all\", 3 representing \"Quite a bit\". The mean value is taken from all the answered items, then multiplied by 100 to obtain the scale score, ranging from 0-100. This process is repeated for each column representing bowel, bladder, and pelvic pain. Medical Necessity:   · Patient is expected to demonstrate progress in coordination to decrease assistance required with Kegel. · Patient demonstrates good rehab potential due to higher previous functional level. Reason for Services/Other Comments:  · Patient continues to require skilled intervention due to incontinence. Use of outcome tool(s) and clinical judgement create a POC that gives a: Questionable prediction of patient's progress: MODERATE COMPLEXITY   TREATMENT:   (In addition to Assessment/Re-Assessment sessions the following treatments were rendered)  Pre-treatment Symptoms/Complaints:  Pt states that she continues to have less leaking. Did leak once when waiting too long to go to the bathroom . Pain: Initial:   Pain Intensity 1: 0 0/10 Post Session:  0/10     THERAPEUTIC EXERCISE: (30 minutes):  Exercises per grid below to improve mobility and coordination. Required minimal verbal and tactile cues to promote proper body breathing techniques. Progressed complexity of movement as indicated. MANUAL THERAPY: (15 minutes): Soft tissue mobilization was utilized and necessary because of the patient's restricted motion of soft tissue. SCS and Trigger point to bilateral LA, OI, and perineum to reduce tone and improve tissue elasticity.    Left has increased tightness compared to the right    Exercises:  Patient instructed in pelvic floor exercises listed below:   Date:  3/8/2018 Date:  3/21/2018   Activity/Exercise     Pt education      Kegel in supine with tactile feedback    10 hon10 off x 10  1on1 off x 10  10 hon10 off x 10  1on1 off x 10    Small jumping jacks with kegel          Kegel in supine with biofeedback assist for visual feedback via vaginal internal sensor   Focus on resting tone and hold contractions 10 min total   10 on 10 off x10 Focus on resting tone and hold contractions 10 min total   10 on 10 off x10   NMES with Kegel  50 HZ 5on10 off 10 min 50 HZ 5on10 off 10 min   Piriformis stretch     Adductor stretch     Jan stretch     Side stepping with ball toss RTB     Lift floor to waist     Bosu     Jumping isabel      Quick feet     Jump rope           [employer] Portal    The following educational topics were reviewed with patient:  Treatment/Session Assessment:  K-fit, K-Goal  · Response to Treatment: Pt did have improved resting tone after manual therapy. Pt making excellent progress. · Compliance with Program/Exercises: Will assess as treatment progresses. · Recommendations/Intent for next treatment session: \"Next visit will focus on advancements to more challenging activities\".   Total Treatment Duration:  PT Patient Time In/Time Out  Time In: 1000  Time Out: 1100    Leena Alejandro, PT

## 2018-05-10 NOTE — PROGRESS NOTES
Lambert Malcolm  : 1980  [facility]  Secondary:  [facility]  [address] [facility], [address] [place] [address],[address], [address]  [phone]   [phone]          OUTPATIENT PHYSICAL THERAPY:Discontinuation Summary 5/10/2018    ICD-10: Treatment Diagnosis: Stress incontinence (female) (male) (N39.3)  Precautions/Allergies:   Pcn [penicillins]   Fall Risk Score: 0 (? 5 = High Risk)  MD Orders: eval and treat MEDICAL/REFERRING DIAGNOSIS:  Overactive bladder [N32.81]   DATE OF ONSET: 2 years  REFERRING PHYSICIAN: Zachary Meehan MD  RETURN PHYSICIAN APPOINTMENT: TBD       DISCHARGE ASSESSMENT:  Ms. Yenni Llanos has been seen for 6 PT visits. She has done excellent with PT. I spoke with her on the phone and she is no longer leaking with exercise. She has not had any major leaks and continues with her HEP. She has met all of her short term goals and all of her long term goals. At this time, I will D/C her. She is welcome to return at any time if her symptoms return. PROBLEM LIST (Impacting functional limitations):  1. Decreased strength of pelvic floor which limits bladder control INTERVENTIONS PLANNED:  1. Neuromuscular re-education  2. Biofeedback as needed  3. HEP  4. Bladder retraining  5. Bladder education  6. Electrical Stimulation  7. Home Exercise Program (HEP)  8. Neuromuscular Re-education/Strengthening  9. Range of Motion (ROM)  10. Therapeutic Activites  11. Therapeutic Exercise/Strengthening   TREATMENT PLAN:  Effective Dates: 2018 TO 2018  Frequency/Duration: 1 time a week for 12 weeks  GOALS: (Goals have been discussed and agreed upon with patient.)  Short-Term Functional Goals: Time Frame: in 3 weeks  1. Patient will demonstrate independence with home exercise program. (met)  2.  Patient will demonstrated good coordination of pelvic floor muscles to improve continence.  (met)  Discharge Goals: Time Frame: in 12 weeks  Pt will be able to participate in 30 min of aerobic activities without urinary incontinence. (MET)  Pt will report a 75 % decrease in urinary incontinent episodes per voiding log in order to decrease social anxiety. (met)  Pt will demonstrate an increase in PFM endurance from 5 hold to 20 hold x 10 reps as measured by EMG within 6 weeks in order to improve PFM activity and thus decrease incontinence. (met)  Patient will demonstrate use of functional brace PFM contraction by performing a \"pelvic brace\" in order to eliminate UI during a jumping isabel(met)  Rehabilitation Potential For Stated Goals: Good  Regarding Lucy Solano's therapy, I certify that the treatment plan above will be carried out by a therapist or under their direction.   Thank you for this referral,  Anitha Holcomb, PT

## 2019-04-26 ENCOUNTER — HOSPITAL ENCOUNTER (OUTPATIENT)
Dept: MAMMOGRAPHY | Age: 39
Discharge: HOME | End: 2019-04-26
Attending: OBSTETRICS & GYNECOLOGY
Payer: COMMERCIAL

## 2019-04-26 DIAGNOSIS — Z12.31 VISIT FOR SCREENING MAMMOGRAM: ICD-10-CM

## 2019-04-26 PROCEDURE — 77063 BREAST TOMOSYNTHESIS BI: CPT

## 2019-05-02 ENCOUNTER — HOSPITAL ENCOUNTER (OUTPATIENT)
Dept: MAMMOGRAPHY | Age: 39
Discharge: HOME | End: 2019-05-02
Attending: OBSTETRICS & GYNECOLOGY
Payer: COMMERCIAL

## 2019-05-02 DIAGNOSIS — R92.8 ABNORMAL SCREENING MAMMOGRAM: ICD-10-CM

## 2019-05-02 PROCEDURE — 76642 ULTRASOUND BREAST LIMITED: CPT

## 2020-04-05 ENCOUNTER — ANESTHESIA EVENT (OUTPATIENT)
Dept: SURGERY | Age: 40
End: 2020-04-05
Payer: COMMERCIAL

## 2020-04-05 NOTE — ANESTHESIA PREPROCEDURE EVALUATION
Relevant Problems   No relevant active problems       Anesthetic History               Review of Systems / Medical History  Patient summary reviewed, nursing notes reviewed and pertinent labs reviewed    Pulmonary                   Neuro/Psych              Cardiovascular    Hypertension: well controlled              Exercise tolerance: >4 METS     GI/Hepatic/Renal                Endo/Other             Other Findings            Physical Exam    Airway  Mallampati: I  TM Distance: 4 - 6 cm  Neck ROM: normal range of motion   Mouth opening: Normal     Cardiovascular  Regular rate and rhythm,  S1 and S2 normal,  no murmur, click, rub, or gallop             Dental         Pulmonary  Breath sounds clear to auscultation               Abdominal  GI exam deferred       Other Findings            Anesthetic Plan    ASA: 2  Anesthesia type: general            Anesthetic plan and risks discussed with: Patient

## 2020-04-06 ENCOUNTER — HOSPITAL ENCOUNTER (OUTPATIENT)
Age: 40
Setting detail: OUTPATIENT SURGERY
Discharge: HOME | End: 2020-04-06
Attending: OBSTETRICS & GYNECOLOGY | Admitting: OBSTETRICS & GYNECOLOGY
Payer: COMMERCIAL

## 2020-04-06 ENCOUNTER — ANESTHESIA (OUTPATIENT)
Dept: SURGERY | Age: 40
End: 2020-04-06
Payer: COMMERCIAL

## 2020-04-06 VITALS
SYSTOLIC BLOOD PRESSURE: 115 MMHG | BODY MASS INDEX: 28.72 KG/M2 | DIASTOLIC BLOOD PRESSURE: 77 MMHG | TEMPERATURE: 98 F | OXYGEN SATURATION: 100 % | HEART RATE: 62 BPM | WEIGHT: 194.5 LBS | RESPIRATION RATE: 16 BRPM

## 2020-04-06 PROBLEM — O03.4 INCOMPLETE ABORTION: Status: ACTIVE | Noted: 2020-04-06

## 2020-04-06 LAB — POTASSIUM BLD-SCNC: 3.5 MMOL/L (ref 3.5–5.1)

## 2020-04-06 PROCEDURE — 84132 ASSAY OF SERUM POTASSIUM: CPT

## 2020-04-06 PROCEDURE — 74011250636 HC RX REV CODE- 250/636: Performed by: ANESTHESIOLOGY

## 2020-04-06 PROCEDURE — 77030010509 HC AIRWY LMA MSK TELE -A: Performed by: ANESTHESIOLOGY

## 2020-04-06 PROCEDURE — 77030020164: Performed by: OBSTETRICS & GYNECOLOGY

## 2020-04-06 PROCEDURE — 76010000154 HC OR TIME FIRST 0.5 HR: Performed by: OBSTETRICS & GYNECOLOGY

## 2020-04-06 PROCEDURE — 76210000020 HC REC RM PH II FIRST 0.5 HR: Performed by: OBSTETRICS & GYNECOLOGY

## 2020-04-06 PROCEDURE — 76210000006 HC OR PH I REC 0.5 TO 1 HR: Performed by: OBSTETRICS & GYNECOLOGY

## 2020-04-06 PROCEDURE — 76060000031 HC ANESTHESIA FIRST 0.5 HR: Performed by: OBSTETRICS & GYNECOLOGY

## 2020-04-06 PROCEDURE — 74011250636 HC RX REV CODE- 250/636: Performed by: NURSE ANESTHETIST, CERTIFIED REGISTERED

## 2020-04-06 PROCEDURE — 88305 TISSUE EXAM BY PATHOLOGIST: CPT

## 2020-04-06 PROCEDURE — 77030008579 HC TBNG UTER SUC CARD -A: Performed by: OBSTETRICS & GYNECOLOGY

## 2020-04-06 PROCEDURE — 77030018836 HC SOL IRR NACL ICUM -A: Performed by: OBSTETRICS & GYNECOLOGY

## 2020-04-06 PROCEDURE — 74011250637 HC RX REV CODE- 250/637: Performed by: OBSTETRICS & GYNECOLOGY

## 2020-04-06 PROCEDURE — 74011000250 HC RX REV CODE- 250: Performed by: NURSE ANESTHETIST, CERTIFIED REGISTERED

## 2020-04-06 RX ORDER — PSEUDOEPHEDRINE HCL 30 MG
30 TABLET ORAL
COMMUNITY
End: 2020-10-08

## 2020-04-06 RX ORDER — KETOROLAC TROMETHAMINE 30 MG/ML
30 INJECTION, SOLUTION INTRAMUSCULAR; INTRAVENOUS AS NEEDED
Status: DISCONTINUED | OUTPATIENT
Start: 2020-04-06 | End: 2020-04-06 | Stop reason: HOSPADM

## 2020-04-06 RX ORDER — DOXYCYCLINE 100 MG/1
200 CAPSULE ORAL
Status: COMPLETED | OUTPATIENT
Start: 2020-04-06 | End: 2020-04-06

## 2020-04-06 RX ORDER — HYDROCHLOROTHIAZIDE 12.5 MG/1
25 CAPSULE ORAL DAILY
COMMUNITY
End: 2020-10-08

## 2020-04-06 RX ORDER — MISOPROSTOL 200 UG/1
200 TABLET ORAL EVERY 4 HOURS
Qty: 4 TAB | Refills: 0 | Status: SHIPPED | OUTPATIENT
Start: 2020-04-06 | End: 2020-04-07

## 2020-04-06 RX ORDER — HYDROMORPHONE HYDROCHLORIDE 2 MG/ML
0.5 INJECTION, SOLUTION INTRAMUSCULAR; INTRAVENOUS; SUBCUTANEOUS
Status: DISCONTINUED | OUTPATIENT
Start: 2020-04-06 | End: 2020-04-06 | Stop reason: HOSPADM

## 2020-04-06 RX ORDER — LIDOCAINE HYDROCHLORIDE 20 MG/ML
INJECTION, SOLUTION EPIDURAL; INFILTRATION; INTRACAUDAL; PERINEURAL AS NEEDED
Status: DISCONTINUED | OUTPATIENT
Start: 2020-04-06 | End: 2020-04-06 | Stop reason: HOSPADM

## 2020-04-06 RX ORDER — NALOXONE HYDROCHLORIDE 0.4 MG/ML
0.1 INJECTION, SOLUTION INTRAMUSCULAR; INTRAVENOUS; SUBCUTANEOUS AS NEEDED
Status: DISCONTINUED | OUTPATIENT
Start: 2020-04-06 | End: 2020-04-06 | Stop reason: HOSPADM

## 2020-04-06 RX ORDER — ACETAMINOPHEN 500 MG
1000 TABLET ORAL ONCE
Status: DISCONTINUED | OUTPATIENT
Start: 2020-04-06 | End: 2020-04-06 | Stop reason: HOSPADM

## 2020-04-06 RX ORDER — OXYCODONE HYDROCHLORIDE 5 MG/1
10 TABLET ORAL
Status: DISCONTINUED | OUTPATIENT
Start: 2020-04-06 | End: 2020-04-06 | Stop reason: HOSPADM

## 2020-04-06 RX ORDER — FENTANYL CITRATE 50 UG/ML
INJECTION, SOLUTION INTRAMUSCULAR; INTRAVENOUS AS NEEDED
Status: DISCONTINUED | OUTPATIENT
Start: 2020-04-06 | End: 2020-04-06 | Stop reason: HOSPADM

## 2020-04-06 RX ORDER — IBUPROFEN 800 MG/1
800 TABLET ORAL
Qty: 60 TAB | Refills: 0 | Status: SHIPPED | OUTPATIENT
Start: 2020-04-06 | End: 2020-10-08

## 2020-04-06 RX ORDER — SODIUM CHLORIDE, SODIUM LACTATE, POTASSIUM CHLORIDE, CALCIUM CHLORIDE 600; 310; 30; 20 MG/100ML; MG/100ML; MG/100ML; MG/100ML
125 INJECTION, SOLUTION INTRAVENOUS CONTINUOUS
Status: DISCONTINUED | OUTPATIENT
Start: 2020-04-06 | End: 2020-04-06 | Stop reason: HOSPADM

## 2020-04-06 RX ORDER — PROPOFOL 10 MG/ML
INJECTION, EMULSION INTRAVENOUS AS NEEDED
Status: DISCONTINUED | OUTPATIENT
Start: 2020-04-06 | End: 2020-04-06 | Stop reason: HOSPADM

## 2020-04-06 RX ORDER — MIDAZOLAM HYDROCHLORIDE 1 MG/ML
2 INJECTION, SOLUTION INTRAMUSCULAR; INTRAVENOUS
Status: COMPLETED | OUTPATIENT
Start: 2020-04-06 | End: 2020-04-06

## 2020-04-06 RX ORDER — ONDANSETRON 2 MG/ML
INJECTION INTRAMUSCULAR; INTRAVENOUS AS NEEDED
Status: DISCONTINUED | OUTPATIENT
Start: 2020-04-06 | End: 2020-04-06 | Stop reason: HOSPADM

## 2020-04-06 RX ORDER — MISOPROSTOL 200 UG/1
200 TABLET ORAL ONCE
Status: COMPLETED | OUTPATIENT
Start: 2020-04-06 | End: 2020-04-06

## 2020-04-06 RX ORDER — LIDOCAINE HYDROCHLORIDE 10 MG/ML
0.1 INJECTION INFILTRATION; PERINEURAL AS NEEDED
Status: DISCONTINUED | OUTPATIENT
Start: 2020-04-06 | End: 2020-04-06 | Stop reason: HOSPADM

## 2020-04-06 RX ORDER — DEXAMETHASONE SODIUM PHOSPHATE 4 MG/ML
INJECTION, SOLUTION INTRA-ARTICULAR; INTRALESIONAL; INTRAMUSCULAR; INTRAVENOUS; SOFT TISSUE AS NEEDED
Status: DISCONTINUED | OUTPATIENT
Start: 2020-04-06 | End: 2020-04-06 | Stop reason: HOSPADM

## 2020-04-06 RX ADMIN — DOXYCYCLINE HYCLATE 200 MG: 100 CAPSULE ORAL at 07:53

## 2020-04-06 RX ADMIN — FENTANYL CITRATE 50 MCG: 50 INJECTION INTRAMUSCULAR; INTRAVENOUS at 10:23

## 2020-04-06 RX ADMIN — FENTANYL CITRATE 25 MCG: 50 INJECTION INTRAMUSCULAR; INTRAVENOUS at 10:34

## 2020-04-06 RX ADMIN — MISOPROSTOL 200 MCG: 200 TABLET ORAL at 07:53

## 2020-04-06 RX ADMIN — PROPOFOL 180 MG: 10 INJECTION, EMULSION INTRAVENOUS at 10:28

## 2020-04-06 RX ADMIN — OXYTOCIN 10 UNITS: 10 INJECTION, SOLUTION INTRAMUSCULAR; INTRAVENOUS at 10:38

## 2020-04-06 RX ADMIN — FENTANYL CITRATE 25 MCG: 50 INJECTION INTRAMUSCULAR; INTRAVENOUS at 10:32

## 2020-04-06 RX ADMIN — LIDOCAINE HYDROCHLORIDE 80 MG: 20 INJECTION, SOLUTION EPIDURAL; INFILTRATION; INTRACAUDAL; PERINEURAL at 10:27

## 2020-04-06 RX ADMIN — DEXAMETHASONE SODIUM PHOSPHATE 4 MG: 4 INJECTION, SOLUTION INTRAMUSCULAR; INTRAVENOUS at 10:38

## 2020-04-06 RX ADMIN — SODIUM CHLORIDE, SODIUM LACTATE, POTASSIUM CHLORIDE, AND CALCIUM CHLORIDE 125 ML/HR: 600; 310; 30; 20 INJECTION, SOLUTION INTRAVENOUS at 08:26

## 2020-04-06 RX ADMIN — ONDANSETRON 4 MG: 2 INJECTION INTRAMUSCULAR; INTRAVENOUS at 10:38

## 2020-04-06 RX ADMIN — SODIUM CHLORIDE, SODIUM LACTATE, POTASSIUM CHLORIDE, AND CALCIUM CHLORIDE 125 ML/HR: 600; 310; 30; 20 INJECTION, SOLUTION INTRAVENOUS at 10:13

## 2020-04-06 RX ADMIN — MIDAZOLAM 2 MG: 1 INJECTION INTRAMUSCULAR; INTRAVENOUS at 08:27

## 2020-04-06 NOTE — INTERVAL H&P NOTE
Update History & Physical    The Patient's History and Physical reviewed with the patient and I examined the patient. There was no change. The surgical site was confirmed by the patient and me. Plan:  The risk, benefits, expected outcome, and alternative to the recommended procedure have been discussed with the patient. Patient understands and wants to proceed with the procedure.     Electronically signed by Blaise Pierson MD on 4/6/2020 at 9:49 AM

## 2020-04-06 NOTE — DISCHARGE INSTRUCTIONS
Surgical : What to Expect at Home  Your Recovery  After a surgical , you may have cramps and light bleeding for up to 2 weeks. Most women can return to normal activities 1 to 2 days after the procedure. Deciding to end a pregnancy is never easy. After an , it is normal to feel relief, sadness, or guilt. These feelings can change from woman to woman. If your feelings of sadness do not go away, or if you are irritable, anxious, or having trouble sleeping, talk with your doctor. Medicines and counseling can help treat depression, if you need them. This care sheet gives you a general idea about how long it will take for you to recover. But each person recovers at a different pace. Follow the steps below to feel better as quickly as possible. How can you care for yourself at home? Activity    · Rest when you feel tired. Getting enough sleep will help you recover.     · Most women can return to normal activities 1 to 2 days after the procedure. Avoid strenuous exercise for about 1 week. Diet    · You can eat your normal diet. If your stomach is upset, try bland, low-fat foods like plain rice, broiled chicken, toast, and yogurt. Medicines    · Your doctor will tell you if and when you can restart your medicines. He or she will also give you instructions about taking any new medicines.     · If you take aspirin or some other blood thinner, ask your doctor if and when to start taking it again. Make sure that you understand exactly what your doctor wants you to do.     · If your doctor prescribed antibiotics, take them as directed. Do not stop taking them just because you feel better. You need to take the full course of antibiotics.     · Be safe with medicines. Take pain medicines exactly as directed. ? If the doctor gave you a prescription medicine for pain, take it as prescribed.   ? If you are not taking a prescription pain medicine, ask your doctor if you can take an over-the-counter medicine. Other instructions    · Do not have sexual intercourse for at least 1 week, or until your doctor says it is okay.     · When you do start having sex again, use birth control, and use condoms to prevent infection. Talk with your doctor about birth control methods that fit your lifestyle.     · During the first week after the procedure, use sanitary pads instead of tampons. Using pads makes it easier to monitor your bleeding.     · Do not rinse your vagina with fluid (douche). Follow-up care is a key part of your treatment and safety. Be sure to make and go to all appointments, and call your doctor if you are having problems. It's also a good idea to know your test results and keep a list of the medicines you take. When should you call for help? Call 911 anytime you think you may need emergency care. For example, call if:    · You passed out (lost consciousness).     · You have chest pain, are short of breath, or cough up blood.    Call your doctor now or seek immediate medical care if:    · You have pain that does not get better after you take pain medicine.     · You cannot pass stools or gas.     · You have bright red vaginal bleeding that soaks through a pad in an hour, or you have large clots.     · You are sick to your stomach or cannot drink fluids.     · You have symptoms of a blood clot in your leg (called a deep vein thrombosis), such as:  ? Pain in your calf, back of the knee, thigh, or groin. ? Redness and swelling in your leg or groin.     · You have signs of infection, such as:  ? Increased pain, swelling, warmth, or redness. ? Red streaks leading from the incision. ? Pus draining from the incision. ? A fever.     · You have vaginal discharge that has increased in amount or smells bad.    Watch closely for any changes in your health, and be sure to contact your doctor if you have any problems. Where can you learn more?   Go to http://tiesha-francisco.info/  Enter I615 in the search box to learn more about \"Surgical : What to Expect at Home. \"  Current as of: May 29, 2019Content Version: 12.4  © 9603-4595 [employer]wise, Incorporated. Care instructions adapted under license by [employer] (which disclaims liability or warranty for this information). If you have questions about a medical condition or this instruction, always ask your healthcare professional. [place] [address] any warranty or liability for your use of this information.

## 2020-04-06 NOTE — H&P
Suction D & E H&P    Tellolashell Remedios  [id number]    Subjective:      Chief complaint: Incomplete     Jorge Dodson is a 46 y.o.  female with Missed  at  8 weeks by last menstrual period and 6 weeks by ultrasound. Maternal Blood type pos. Ultrasound showed first trimester findings: intrauterine sac and smal FP << dates no FHR. Had cytotec x 2 and passed tissue but quant HCGs are not falling.     Past Medical History:   Diagnosis Date    Hypertension      Past Surgical History:   Procedure Laterality Date    HX WISDOM TEETH EXTRACTION       OB History        3    Para   2    Term   2            AB   1    Living   2       SAB   1    TAB        Ectopic        Molar        Multiple   0    Live Births   2                Allergies   Allergen Reactions    Ibuprofen Swelling    Pcn [Penicillins] Hives     Allergic as a child       Current Facility-Administered Medications   Medication Dose Route Frequency    lidocaine (XYLOCAINE) 10 mg/mL (1 %) injection 0.1 mL  0.1 mL SubCUTAneous PRN    lactated Ringers infusion  125 mL/hr IntraVENous CONTINUOUS    acetaminophen (TYLENOL) tablet 1,000 mg  1,000 mg Oral ONCE    midazolam (VERSED) injection 2 mg  2 mg IntraVENous ONCE PRN    miSOPROStoL (CYTOTEC) tablet 200 mcg  200 mcg Oral ONCE    doxycycline (VIBRAMYCIN) capsule 200 mg  200 mg Oral ON CALL TO OR       Family History   Problem Relation Age of Onset    Breast Cancer Maternal Grandmother 61    Heart Disease Maternal Grandfather     Heart Disease Paternal Grandfather     Cancer Paternal Grandfather     Hypertension Mother     Ovarian Cancer Neg Hx     Asthma Neg Hx     Bleeding Prob Neg Hx      Social History     Socioeconomic History    Marital status:      Spouse name: Not on file    Number of children: Not on file    Years of education: Not on file    Highest education level: Not on file   Occupational History    Not on file   Social Needs    Financial resource strain: Not on file    Food insecurity     Worry: Not on file     Inability: Not on file    Transportation needs     Medical: Not on file     Non-medical: Not on file   Tobacco Use    Smoking status: Never Smoker    Smokeless tobacco: Never Used   Substance and Sexual Activity    Alcohol use: Yes     Comment: social     Drug use: Not on file    Sexual activity: Yes     Partners: Male     Birth control/protection: None   Lifestyle    Physical activity     Days per week: Not on file     Minutes per session: Not on file    Stress: Not on file   Relationships    Social connections     Talks on phone: Not on file     Gets together: Not on file     Attends Orthodox service: Not on file     Active member of club or organization: Not on file     Attends meetings of clubs or organizations: Not on file     Relationship status: Not on file    Intimate partner violence     Fear of current or ex partner: Not on file     Emotionally abused: Not on file     Physically abused: Not on file     Forced sexual activity: Not on file   Other Topics Concern    Not on file   Social History Narrative    Not on file         Review of Systems:  Constitutional: negative  Cardiovascular: negative  Gastrointestinal: negative  Genitourinary: negative     Objective:     Vitals:    20 0708   BP: (!) 136/98   Pulse: 67   Resp: 16   Temp: 98.1 °F (36.7 °C)   SpO2: 99%   Weight: 194 lb 8 oz (88.2 kg)       Physical Exam:  General:  alert, cooperative, no distress, appears stated age   Lungs:  clear to auscultation bilaterally   Heart:  regular rate and rhythm, S1, S2 normal, no murmur, click, rub or gallop             Assessment:     Incomplete     Plan:     1.  Procedure(s) (LRB):  DILATATION AND CURETTAGE WITH SUCTION BLOOD TYPE B+/6 WKS (N/A)    Discussed the risk of surgery including the risks of bleeding, infection, DVT, and surgical injuries to internal organs including but not limited to the female reproductive organs. The patient understands the risks, any and all questions were answered to the patient's satisfaction.

## 2020-04-06 NOTE — OP NOTES
SUCTION CURETTAGE FULL OP NOTE    Liz Delaney  [id number]  1980      DATE OF PROCEDURE:  2020    PREOPERATIVE DIAGNOSIS:  Missed  [O02.1]    POSTOPERATIVE DIAGNOSIS:  Missed  [O02.1]    PROCEDURE: Procedure(s):  DILATATION AND CURETTAGE WITH SUCTION BLOOD TYPE B+     SURGEON:  Denzel Anderson MD    ASSISTANT: N/A    ANESTHESIA:general    EBL: less than 50 ml    SPECIMENS: Products of conception    FINDINGS: retained POC    DESCRIPTION OF PROCEDURE: Patient was placed on the operating table in the supine position and placed under general endotracheal anesthesia. She was repositioned in the dorsal lithotomy position and prepped and draped in the usual fashion for vaginal surgery. Time out was done to confirm the operating procedure, surgeon, patient and site. Once confirmed by the team, procedure was started. Bimanual exam performed to confirm uterus location. Cervix was exposed with a weighted vaginal speculum and grasped with a single-tooth tenaculum. The uterus was sounded to approximately 8 cm and the cervix dilated. A curved 8mm suction curette device was then introduced into the endometrial cavity  Thorough suction curettage followed by sharp curettage with a large curette followed again by suction curettage was performed until the suction returned no further clot or products of conception. A good uterine crie was obtained. The uterus was massaged. Hemostasis appeared normal, Instruments were removed. The patient went to the PACU in satisfactory condition. All counts were correct.     Denzel Anderson MD  2020  10:48 AM

## 2020-04-06 NOTE — PROGRESS NOTES
Support given to patient in 6 week fetal demise. \"[employer]\" early pregnancy loss brochure and Poonam Smith support group information given along with contact information. Dionicio Stewart M.Div.

## 2020-04-06 NOTE — ANESTHESIA POSTPROCEDURE EVALUATION
Procedure(s):  DILATATION AND CURETTAGE WITH SUCTION BLOOD TYPE B+.     general    Anesthesia Post Evaluation        Patient location during evaluation: PACU  Patient participation: complete - patient participated  Level of consciousness: responsive to verbal stimuli  Pain management: adequate  Airway patency: patent  Anesthetic complications: no  Cardiovascular status: acceptable  Respiratory status: acceptable  Hydration status: euvolemic        Vitals Value Taken Time   /68 4/6/2020 11:20 AM   Temp 36.7 °C (98 °F) 4/6/2020 10:50 AM   Pulse 59 4/6/2020 11:20 AM   Resp 16 4/6/2020 11:20 AM   SpO2 100 % 4/6/2020 11:20 AM

## 2020-10-08 PROBLEM — O10.919 CHRONIC HYPERTENSION AFFECTING PREGNANCY: Status: ACTIVE | Noted: 2020-10-08

## 2020-10-08 PROBLEM — O09.519 ADVANCED MATERNAL AGE, 1ST PREGNANCY: Status: ACTIVE | Noted: 2020-10-08

## 2020-10-08 PROBLEM — O03.4 INCOMPLETE ABORTION: Status: RESOLVED | Noted: 2020-04-06 | Resolved: 2020-10-08

## 2020-11-16 PROBLEM — Z36.82 NUCHAL TRANSLUCENCY OF FETUS ON PRENATAL ULTRASOUND: Status: ACTIVE | Noted: 2020-11-16

## 2020-11-16 PROBLEM — O99.341 ANXIETY DURING PREGNANCY IN FIRST TRIMESTER, ANTEPARTUM: Status: ACTIVE | Noted: 2020-11-16

## 2020-11-16 PROBLEM — O09.521 HIGH RISK PREGNANCY, MULTIGRAVIDA OF ADVANCED MATERNAL AGE IN FIRST TRIMESTER: Status: ACTIVE | Noted: 2020-10-08

## 2020-11-16 PROBLEM — F41.9 ANXIETY DURING PREGNANCY IN FIRST TRIMESTER, ANTEPARTUM: Status: ACTIVE | Noted: 2020-11-16

## 2021-01-05 PROBLEM — Z36.82 NUCHAL TRANSLUCENCY OF FETUS ON PRENATAL ULTRASOUND: Status: RESOLVED | Noted: 2020-11-16 | Resolved: 2021-01-05

## 2021-01-05 PROBLEM — O09.522 HIGH RISK PREGNANCY, MULTIGRAVIDA OF ADVANCED MATERNAL AGE IN SECOND TRIMESTER: Status: ACTIVE | Noted: 2020-10-08

## 2021-01-12 PROBLEM — N39.3 SUI (STRESS URINARY INCONTINENCE, FEMALE): Status: ACTIVE | Noted: 2021-01-12

## 2021-02-04 PROBLEM — O36.60X0 LARGE FOR GESTATIONAL AGE FETUS AFFECTING MANAGEMENT OF MOTHER: Status: ACTIVE | Noted: 2021-02-04

## 2021-04-28 PROBLEM — Z92.29 COVID-19 VACCINE SERIES COMPLETED: Status: ACTIVE | Noted: 2021-04-28

## 2021-05-11 ENCOUNTER — HOSPITAL ENCOUNTER (INPATIENT)
Age: 41
LOS: 4 days | Discharge: HOME | End: 2021-05-15
Attending: OBSTETRICS & GYNECOLOGY | Admitting: OBSTETRICS & GYNECOLOGY
Payer: COMMERCIAL

## 2021-05-11 PROBLEM — O13.3 GESTATIONAL HYPERTENSION, THIRD TRIMESTER: Status: ACTIVE | Noted: 2021-05-11

## 2021-05-11 LAB
ABO + RH BLD: NORMAL
BLOOD GROUP ANTIBODIES SERPL: NORMAL
ERYTHROCYTE [DISTWIDTH] IN BLOOD BY AUTOMATED COUNT: 13.4 % (ref 11.9–14.6)
HCT VFR BLD AUTO: 34.7 % (ref 35.8–46.3)
HGB BLD-MCNC: 11.7 G/DL (ref 11.7–15.4)
MCH RBC QN AUTO: 27.8 PG (ref 26.1–32.9)
MCHC RBC AUTO-ENTMCNC: 33.7 G/DL (ref 31.4–35)
MCV RBC AUTO: 82.4 FL (ref 79.6–97.8)
NRBC # BLD: 0 K/UL (ref 0–0.2)
PLATELET # BLD AUTO: 169 K/UL (ref 150–450)
PMV BLD AUTO: 11.5 FL (ref 9.4–12.3)
RBC # BLD AUTO: 4.21 M/UL (ref 4.05–5.2)
SPECIMEN EXP DATE BLD: NORMAL
WBC # BLD AUTO: 10.9 K/UL (ref 4.3–11.1)

## 2021-05-11 PROCEDURE — 74011250636 HC RX REV CODE- 250/636: Performed by: OBSTETRICS & GYNECOLOGY

## 2021-05-11 PROCEDURE — 86901 BLOOD TYPING SEROLOGIC RH(D): CPT

## 2021-05-11 PROCEDURE — 85027 COMPLETE CBC AUTOMATED: CPT

## 2021-05-11 PROCEDURE — 74011000250 HC RX REV CODE- 250: Performed by: OBSTETRICS & GYNECOLOGY

## 2021-05-11 PROCEDURE — 74011250637 HC RX REV CODE- 250/637: Performed by: OBSTETRICS & GYNECOLOGY

## 2021-05-11 PROCEDURE — 65270000029 HC RM PRIVATE

## 2021-05-11 RX ORDER — LIDOCAINE HYDROCHLORIDE 10 MG/ML
1 INJECTION INFILTRATION; PERINEURAL
Status: ACTIVE | OUTPATIENT
Start: 2021-05-11 | End: 2021-05-12

## 2021-05-11 RX ORDER — BUTORPHANOL TARTRATE 2 MG/ML
1 INJECTION INTRAMUSCULAR; INTRAVENOUS
Status: DISCONTINUED | OUTPATIENT
Start: 2021-05-11 | End: 2021-05-12 | Stop reason: HOSPADM

## 2021-05-11 RX ORDER — ONDANSETRON 2 MG/ML
4 INJECTION INTRAMUSCULAR; INTRAVENOUS
Status: DISCONTINUED | OUTPATIENT
Start: 2021-05-11 | End: 2021-05-13

## 2021-05-11 RX ORDER — MINERAL OIL
120 OIL (ML) ORAL
Status: ACTIVE | OUTPATIENT
Start: 2021-05-11 | End: 2021-05-12

## 2021-05-11 RX ORDER — ZOLPIDEM TARTRATE 5 MG/1
5 TABLET ORAL
Status: DISCONTINUED | OUTPATIENT
Start: 2021-05-11 | End: 2021-05-15 | Stop reason: HOSPADM

## 2021-05-11 RX ORDER — CEFAZOLIN SODIUM/WATER 2 G/20 ML
2 SYRINGE (ML) INTRAVENOUS ONCE
Status: COMPLETED | OUTPATIENT
Start: 2021-05-11 | End: 2021-05-11

## 2021-05-11 RX ORDER — LIDOCAINE HYDROCHLORIDE 20 MG/ML
JELLY TOPICAL
Status: ACTIVE | OUTPATIENT
Start: 2021-05-11 | End: 2021-05-12

## 2021-05-11 RX ORDER — ACETAMINOPHEN 325 MG/1
650 TABLET ORAL
Status: DISCONTINUED | OUTPATIENT
Start: 2021-05-11 | End: 2021-05-13 | Stop reason: SDUPTHER

## 2021-05-11 RX ADMIN — ZOLPIDEM TARTRATE 5 MG: 5 TABLET ORAL at 23:05

## 2021-05-11 RX ADMIN — MISOPROSTOL 50 MCG: 100 TABLET ORAL at 20:15

## 2021-05-11 RX ADMIN — CEFAZOLIN SODIUM 2 G: 100 INJECTION, POWDER, LYOPHILIZED, FOR SOLUTION INTRAVENOUS at 20:15

## 2021-05-12 ENCOUNTER — ANESTHESIA (OUTPATIENT)
Dept: LABOR AND DELIVERY | Age: 41
End: 2021-05-12
Payer: COMMERCIAL

## 2021-05-12 ENCOUNTER — ANESTHESIA EVENT (OUTPATIENT)
Dept: LABOR AND DELIVERY | Age: 41
End: 2021-05-12
Payer: COMMERCIAL

## 2021-05-12 PROBLEM — Z37.9 NORMAL LABOR: Status: ACTIVE | Noted: 2021-05-12

## 2021-05-12 PROBLEM — O16.9 HYPERTENSION AFFECTING PREGNANCY: Status: ACTIVE | Noted: 2021-05-12

## 2021-05-12 LAB
BASE DEFICIT BLD-SCNC: 2.5 MMOL/L
BASE DEFICIT BLD-SCNC: 3.3 MMOL/L
HCO3 BLD-SCNC: 23.7 MMOL/L (ref 22–26)
HCO3 BLDV-SCNC: 20.6 MMOL/L (ref 23–28)
PCO2 BLDCO: 33 MMHG (ref 32–68)
PCO2 BLDCO: 45 MMHG (ref 32–68)
PH BLDCO: 7.33 [PH] (ref 7.15–7.38)
PH BLDCO: 7.41 [PH] (ref 7.15–7.38)
PO2 BLDCO: 21 MMHG
PO2 BLDCO: 7 MMHG
SAO2 % BLD: 5.1 % (ref 95–98)
SAO2 % BLDV: 35.7 % (ref 65–88)
SERVICE CMNT-IMP: ABNORMAL
SERVICE CMNT-IMP: ABNORMAL
SPECIMEN TYPE: ABNORMAL
SPECIMEN TYPE: ABNORMAL

## 2021-05-12 PROCEDURE — 4A0H7CZ MEASUREMENT OF PRODUCTS OF CONCEPTION, CARDIAC RATE, VIA NATURAL OR ARTIFICIAL OPENING: ICD-10-PCS | Performed by: OBSTETRICS & GYNECOLOGY

## 2021-05-12 PROCEDURE — 77030014125 HC TY EPDRL BBMI -B: Performed by: ANESTHESIOLOGY

## 2021-05-12 PROCEDURE — 74011000250 HC RX REV CODE- 250: Performed by: NURSE ANESTHETIST, CERTIFIED REGISTERED

## 2021-05-12 PROCEDURE — 77030002974 HC SUT PLN J&J -A: Performed by: OBSTETRICS & GYNECOLOGY

## 2021-05-12 PROCEDURE — 74011250637 HC RX REV CODE- 250/637: Performed by: OBSTETRICS & GYNECOLOGY

## 2021-05-12 PROCEDURE — 3E0P7VZ INTRODUCTION OF HORMONE INTO FEMALE REPRODUCTIVE, VIA NATURAL OR ARTIFICIAL OPENING: ICD-10-PCS | Performed by: OBSTETRICS & GYNECOLOGY

## 2021-05-12 PROCEDURE — 10H07YZ INSERTION OF OTHER DEVICE INTO PRODUCTS OF CONCEPTION, VIA NATURAL OR ARTIFICIAL OPENING: ICD-10-PCS | Performed by: OBSTETRICS & GYNECOLOGY

## 2021-05-12 PROCEDURE — 77030010848 HC CATH INTUTR PRSS KOLB -B

## 2021-05-12 PROCEDURE — 76010000392 HC C SECN EA ADDL 0.5 HR: Performed by: OBSTETRICS & GYNECOLOGY

## 2021-05-12 PROCEDURE — 74011250636 HC RX REV CODE- 250/636: Performed by: NURSE ANESTHETIST, CERTIFIED REGISTERED

## 2021-05-12 PROCEDURE — 77030002888 HC SUT CHRMC J&J -A: Performed by: OBSTETRICS & GYNECOLOGY

## 2021-05-12 PROCEDURE — 3E033VJ INTRODUCTION OF OTHER HORMONE INTO PERIPHERAL VEIN, PERCUTANEOUS APPROACH: ICD-10-PCS | Performed by: OBSTETRICS & GYNECOLOGY

## 2021-05-12 PROCEDURE — 75410000003 HC RECOV DEL/VAG/CSECN EA 0.5 HR

## 2021-05-12 PROCEDURE — 74011000250 HC RX REV CODE- 250: Performed by: OBSTETRICS & GYNECOLOGY

## 2021-05-12 PROCEDURE — 77030009413 HC ELECTRD SCALP COVD -A

## 2021-05-12 PROCEDURE — 10H073Z INSERTION OF MONITORING ELECTRODE INTO PRODUCTS OF CONCEPTION, VIA NATURAL OR ARTIFICIAL OPENING: ICD-10-PCS | Performed by: OBSTETRICS & GYNECOLOGY

## 2021-05-12 PROCEDURE — 77030034696 HC CATH URETH FOL 2W BARD -A: Performed by: OBSTETRICS & GYNECOLOGY

## 2021-05-12 PROCEDURE — 77030039266 HC ADH SKN EXOFIN S2SG -A: Performed by: OBSTETRICS & GYNECOLOGY

## 2021-05-12 PROCEDURE — 74011000250 HC RX REV CODE- 250: Performed by: ANESTHESIOLOGY

## 2021-05-12 PROCEDURE — 74011000250 HC RX REV CODE- 250: Performed by: REGISTERED NURSE

## 2021-05-12 PROCEDURE — 77030002933 HC SUT MCRYL J&J -A: Performed by: OBSTETRICS & GYNECOLOGY

## 2021-05-12 PROCEDURE — 10907ZC DRAINAGE OF AMNIOTIC FLUID, THERAPEUTIC FROM PRODUCTS OF CONCEPTION, VIA NATURAL OR ARTIFICIAL OPENING: ICD-10-PCS | Performed by: OBSTETRICS & GYNECOLOGY

## 2021-05-12 PROCEDURE — 74011250636 HC RX REV CODE- 250/636: Performed by: OBSTETRICS & GYNECOLOGY

## 2021-05-12 PROCEDURE — A4300 CATH IMPL VASC ACCESS PORTAL: HCPCS | Performed by: ANESTHESIOLOGY

## 2021-05-12 PROCEDURE — 77030005518 HC CATH URETH FOL 2W BARD -B

## 2021-05-12 PROCEDURE — 82803 BLOOD GASES ANY COMBINATION: CPT

## 2021-05-12 PROCEDURE — 76060000078 HC EPIDURAL ANESTHESIA: Performed by: OBSTETRICS & GYNECOLOGY

## 2021-05-12 PROCEDURE — 77030032490 HC SLV COMPR SCD KNE COVD -B

## 2021-05-12 PROCEDURE — 3E0R3BZ INTRODUCTION OF ANESTHETIC AGENT INTO SPINAL CANAL, PERCUTANEOUS APPROACH: ICD-10-PCS | Performed by: ANESTHESIOLOGY

## 2021-05-12 PROCEDURE — 65270000029 HC RM PRIVATE

## 2021-05-12 PROCEDURE — 76060000078 HC EPIDURAL ANESTHESIA

## 2021-05-12 PROCEDURE — 00HU33Z INSERTION OF INFUSION DEVICE INTO SPINAL CANAL, PERCUTANEOUS APPROACH: ICD-10-PCS | Performed by: ANESTHESIOLOGY

## 2021-05-12 PROCEDURE — 74011250636 HC RX REV CODE- 250/636

## 2021-05-12 PROCEDURE — 77030032490 HC SLV COMPR SCD KNE COVD -B: Performed by: OBSTETRICS & GYNECOLOGY

## 2021-05-12 PROCEDURE — 74011250637 HC RX REV CODE- 250/637: Performed by: ANESTHESIOLOGY

## 2021-05-12 PROCEDURE — 74011000258 HC RX REV CODE- 258: Performed by: OBSTETRICS & GYNECOLOGY

## 2021-05-12 PROCEDURE — 75410000003 HC RECOV DEL/VAG/CSECN EA 0.5 HR: Performed by: OBSTETRICS & GYNECOLOGY

## 2021-05-12 PROCEDURE — 75410000002 HC LABOR FEE PER 1 HR

## 2021-05-12 PROCEDURE — 74011250636 HC RX REV CODE- 250/636: Performed by: REGISTERED NURSE

## 2021-05-12 PROCEDURE — 77030018846 HC SOL IRR STRL H20 ICUM -A

## 2021-05-12 PROCEDURE — 2709999900 HC NON-CHARGEABLE SUPPLY: Performed by: OBSTETRICS & GYNECOLOGY

## 2021-05-12 PROCEDURE — 59510 CESAREAN DELIVERY: CPT | Performed by: OBSTETRICS & GYNECOLOGY

## 2021-05-12 PROCEDURE — 76010000391 HC C SECN FIRST 1 HR: Performed by: OBSTETRICS & GYNECOLOGY

## 2021-05-12 RX ORDER — SODIUM CHLORIDE 0.9 % (FLUSH) 0.9 %
5-40 SYRINGE (ML) INJECTION AS NEEDED
Status: DISCONTINUED | OUTPATIENT
Start: 2021-05-12 | End: 2021-05-14 | Stop reason: ALTCHOICE

## 2021-05-12 RX ORDER — DEXTROSE, SODIUM CHLORIDE, SODIUM LACTATE, POTASSIUM CHLORIDE, AND CALCIUM CHLORIDE 5; .6; .31; .03; .02 G/100ML; G/100ML; G/100ML; G/100ML; G/100ML
125 INJECTION, SOLUTION INTRAVENOUS CONTINUOUS
Status: DISCONTINUED | OUTPATIENT
Start: 2021-05-12 | End: 2021-05-12 | Stop reason: HOSPADM

## 2021-05-12 RX ORDER — OXYCODONE HYDROCHLORIDE 5 MG/1
5 TABLET ORAL
Status: DISCONTINUED | OUTPATIENT
Start: 2021-05-12 | End: 2021-05-15 | Stop reason: HOSPADM

## 2021-05-12 RX ORDER — SODIUM CHLORIDE 0.9 % (FLUSH) 0.9 %
5-40 SYRINGE (ML) INJECTION EVERY 8 HOURS
Status: DISCONTINUED | OUTPATIENT
Start: 2021-05-12 | End: 2021-05-14 | Stop reason: ALTCHOICE

## 2021-05-12 RX ORDER — SIMETHICONE 80 MG
80 TABLET,CHEWABLE ORAL
Status: DISCONTINUED | OUTPATIENT
Start: 2021-05-13 | End: 2021-05-15 | Stop reason: HOSPADM

## 2021-05-12 RX ORDER — OXYTOCIN/RINGER'S LACTATE 30/500 ML
0-20 PLASTIC BAG, INJECTION (ML) INTRAVENOUS
Status: DISCONTINUED | OUTPATIENT
Start: 2021-05-12 | End: 2021-05-14 | Stop reason: ALTCHOICE

## 2021-05-12 RX ORDER — DEXTROSE, SODIUM CHLORIDE, SODIUM LACTATE, POTASSIUM CHLORIDE, AND CALCIUM CHLORIDE 5; .6; .31; .03; .02 G/100ML; G/100ML; G/100ML; G/100ML; G/100ML
125 INJECTION, SOLUTION INTRAVENOUS CONTINUOUS
Status: DISCONTINUED | OUTPATIENT
Start: 2021-05-12 | End: 2021-05-14 | Stop reason: ALTCHOICE

## 2021-05-12 RX ORDER — NALOXONE HYDROCHLORIDE 0.4 MG/ML
0.4 INJECTION, SOLUTION INTRAMUSCULAR; INTRAVENOUS; SUBCUTANEOUS AS NEEDED
Status: DISCONTINUED | OUTPATIENT
Start: 2021-05-12 | End: 2021-05-15 | Stop reason: HOSPADM

## 2021-05-12 RX ORDER — OXYTOCIN/RINGER'S LACTATE 30/500 ML
PLASTIC BAG, INJECTION (ML) INTRAVENOUS
Status: DISCONTINUED | OUTPATIENT
Start: 2021-05-12 | End: 2021-05-12 | Stop reason: HOSPADM

## 2021-05-12 RX ORDER — ROPIVACAINE HYDROCHLORIDE 2 MG/ML
INJECTION, SOLUTION EPIDURAL; INFILTRATION; PERINEURAL AS NEEDED
Status: DISCONTINUED | OUTPATIENT
Start: 2021-05-12 | End: 2021-05-12 | Stop reason: HOSPADM

## 2021-05-12 RX ORDER — ONDANSETRON 4 MG/1
4 TABLET, ORALLY DISINTEGRATING ORAL
Status: DISCONTINUED | OUTPATIENT
Start: 2021-05-12 | End: 2021-05-13

## 2021-05-12 RX ORDER — TERBUTALINE SULFATE 1 MG/ML
INJECTION SUBCUTANEOUS
Status: COMPLETED
Start: 2021-05-12 | End: 2021-05-12

## 2021-05-12 RX ORDER — OXYTOCIN/RINGER'S LACTATE 30/500 ML
10 PLASTIC BAG, INJECTION (ML) INTRAVENOUS AS NEEDED
Status: DISCONTINUED | OUTPATIENT
Start: 2021-05-12 | End: 2021-05-14 | Stop reason: ALTCHOICE

## 2021-05-12 RX ORDER — TERBUTALINE SULFATE 1 MG/ML
0.25 INJECTION SUBCUTANEOUS
Status: COMPLETED | OUTPATIENT
Start: 2021-05-12 | End: 2021-05-12

## 2021-05-12 RX ORDER — PRENATAL VIT 96/IRON FUM/FOLIC 27MG-0.8MG
1 TABLET ORAL DAILY
Status: DISCONTINUED | OUTPATIENT
Start: 2021-05-13 | End: 2021-05-15 | Stop reason: HOSPADM

## 2021-05-12 RX ORDER — ROPIVACAINE HYDROCHLORIDE 2 MG/ML
INJECTION, SOLUTION EPIDURAL; INFILTRATION; PERINEURAL
Status: DISCONTINUED | OUTPATIENT
Start: 2021-05-12 | End: 2021-05-12 | Stop reason: HOSPADM

## 2021-05-12 RX ORDER — SODIUM CHLORIDE 0.9 % (FLUSH) 0.9 %
5-40 SYRINGE (ML) INJECTION AS NEEDED
Status: DISCONTINUED | OUTPATIENT
Start: 2021-05-12 | End: 2021-05-12 | Stop reason: HOSPADM

## 2021-05-12 RX ORDER — MORPHINE SULFATE 0.5 MG/ML
INJECTION, SOLUTION EPIDURAL; INTRATHECAL; INTRAVENOUS AS NEEDED
Status: DISCONTINUED | OUTPATIENT
Start: 2021-05-12 | End: 2021-05-12 | Stop reason: HOSPADM

## 2021-05-12 RX ORDER — SODIUM CHLORIDE 0.9 % (FLUSH) 0.9 %
5-40 SYRINGE (ML) INJECTION EVERY 8 HOURS
Status: DISCONTINUED | OUTPATIENT
Start: 2021-05-12 | End: 2021-05-12 | Stop reason: HOSPADM

## 2021-05-12 RX ORDER — KETOROLAC TROMETHAMINE 30 MG/ML
INJECTION, SOLUTION INTRAMUSCULAR; INTRAVENOUS AS NEEDED
Status: DISCONTINUED | OUTPATIENT
Start: 2021-05-12 | End: 2021-05-12 | Stop reason: HOSPADM

## 2021-05-12 RX ORDER — TRISODIUM CITRATE DIHYDRATE AND CITRIC ACID MONOHYDRATE 500; 334 MG/5ML; MG/5ML
30 SOLUTION ORAL
Status: COMPLETED | OUTPATIENT
Start: 2021-05-12 | End: 2021-05-12

## 2021-05-12 RX ORDER — LIDOCAINE HYDROCHLORIDE AND EPINEPHRINE 15; 5 MG/ML; UG/ML
INJECTION, SOLUTION EPIDURAL
Status: COMPLETED | OUTPATIENT
Start: 2021-05-12 | End: 2021-05-12

## 2021-05-12 RX ORDER — DIPHENHYDRAMINE HCL 25 MG
25 CAPSULE ORAL
Status: DISCONTINUED | OUTPATIENT
Start: 2021-05-12 | End: 2021-05-15 | Stop reason: HOSPADM

## 2021-05-12 RX ORDER — SODIUM CHLORIDE, SODIUM LACTATE, POTASSIUM CHLORIDE, CALCIUM CHLORIDE 600; 310; 30; 20 MG/100ML; MG/100ML; MG/100ML; MG/100ML
INJECTION, SOLUTION INTRAVENOUS
Status: DISCONTINUED | OUTPATIENT
Start: 2021-05-12 | End: 2021-05-12 | Stop reason: HOSPADM

## 2021-05-12 RX ORDER — LORATADINE 10 MG/1
10 TABLET ORAL DAILY
Status: DISCONTINUED | OUTPATIENT
Start: 2021-05-13 | End: 2021-05-15 | Stop reason: HOSPADM

## 2021-05-12 RX ORDER — DOCUSATE SODIUM 100 MG/1
100 CAPSULE, LIQUID FILLED ORAL 2 TIMES DAILY
Status: DISCONTINUED | OUTPATIENT
Start: 2021-05-13 | End: 2021-05-15 | Stop reason: HOSPADM

## 2021-05-12 RX ORDER — EPHEDRINE SULFATE/0.9% NACL/PF 50 MG/5 ML
SYRINGE (ML) INTRAVENOUS AS NEEDED
Status: DISCONTINUED | OUTPATIENT
Start: 2021-05-12 | End: 2021-05-12 | Stop reason: HOSPADM

## 2021-05-12 RX ORDER — OXYTOCIN/RINGER'S LACTATE 30/500 ML
87.3 PLASTIC BAG, INJECTION (ML) INTRAVENOUS AS NEEDED
Status: COMPLETED | OUTPATIENT
Start: 2021-05-12 | End: 2021-05-12

## 2021-05-12 RX ORDER — LIDOCAINE HYDROCHLORIDE AND EPINEPHRINE 20; 5 MG/ML; UG/ML
INJECTION, SOLUTION EPIDURAL; INFILTRATION; INTRACAUDAL; PERINEURAL AS NEEDED
Status: DISCONTINUED | OUTPATIENT
Start: 2021-05-12 | End: 2021-05-12 | Stop reason: HOSPADM

## 2021-05-12 RX ORDER — OXYTOCIN/RINGER'S LACTATE 30/500 ML
500 PLASTIC BAG, INJECTION (ML) INTRAVENOUS CONTINUOUS
Status: DISCONTINUED | OUTPATIENT
Start: 2021-05-12 | End: 2021-05-14 | Stop reason: ALTCHOICE

## 2021-05-12 RX ORDER — ONDANSETRON 2 MG/ML
INJECTION INTRAMUSCULAR; INTRAVENOUS AS NEEDED
Status: DISCONTINUED | OUTPATIENT
Start: 2021-05-12 | End: 2021-05-12 | Stop reason: HOSPADM

## 2021-05-12 RX ORDER — OXYCODONE HYDROCHLORIDE 5 MG/1
10 TABLET ORAL
Status: DISCONTINUED | OUTPATIENT
Start: 2021-05-12 | End: 2021-05-15 | Stop reason: HOSPADM

## 2021-05-12 RX ORDER — CEFAZOLIN SODIUM/WATER 2 G/20 ML
2 SYRINGE (ML) INTRAVENOUS
Status: COMPLETED | OUTPATIENT
Start: 2021-05-12 | End: 2021-05-12

## 2021-05-12 RX ORDER — ACETAMINOPHEN 500 MG
1000 TABLET ORAL
Status: DISCONTINUED | OUTPATIENT
Start: 2021-05-12 | End: 2021-05-13 | Stop reason: SDUPTHER

## 2021-05-12 RX ADMIN — MORPHINE SULFATE 1 MG: 0.5 INJECTION, SOLUTION EPIDURAL; INTRATHECAL; INTRAVENOUS at 21:17

## 2021-05-12 RX ADMIN — SODIUM CHLORIDE, SODIUM LACTATE, POTASSIUM CHLORIDE, CALCIUM CHLORIDE, AND DEXTROSE MONOHYDRATE 125 ML/HR: 600; 310; 30; 20; 5 INJECTION, SOLUTION INTRAVENOUS at 06:37

## 2021-05-12 RX ADMIN — SODIUM CITRATE AND CITRIC ACID MONOHYDRATE 30 ML: 500; 334 SOLUTION ORAL at 20:26

## 2021-05-12 RX ADMIN — ONDANSETRON 4 MG: 2 INJECTION INTRAMUSCULAR; INTRAVENOUS at 21:02

## 2021-05-12 RX ADMIN — SODIUM CHLORIDE, SODIUM LACTATE, POTASSIUM CHLORIDE, AND CALCIUM CHLORIDE: 600; 310; 30; 20 INJECTION, SOLUTION INTRAVENOUS at 21:21

## 2021-05-12 RX ADMIN — Medication 4 ML: at 13:22

## 2021-05-12 RX ADMIN — Medication 15 MG: at 13:31

## 2021-05-12 RX ADMIN — ONDANSETRON 4 MG: 2 INJECTION INTRAMUSCULAR; INTRAVENOUS at 19:21

## 2021-05-12 RX ADMIN — LIDOCAINE HYDROCHLORIDE,EPINEPHRINE BITARTRATE 2 ML: 20; .005 INJECTION, SOLUTION EPIDURAL; INFILTRATION; INTRACAUDAL; PERINEURAL at 20:41

## 2021-05-12 RX ADMIN — BUTORPHANOL TARTRATE 1 MG: 2 INJECTION, SOLUTION INTRAMUSCULAR; INTRAVENOUS at 10:02

## 2021-05-12 RX ADMIN — AZITHROMYCIN MONOHYDRATE 500 MG: 500 INJECTION, POWDER, LYOPHILIZED, FOR SOLUTION INTRAVENOUS at 20:36

## 2021-05-12 RX ADMIN — SODIUM CHLORIDE, SODIUM LACTATE, POTASSIUM CHLORIDE, AND CALCIUM CHLORIDE 500 ML: 600; 310; 30; 20 INJECTION, SOLUTION INTRAVENOUS at 12:58

## 2021-05-12 RX ADMIN — Medication 20 MG: at 14:51

## 2021-05-12 RX ADMIN — MISOPROSTOL 50 MCG: 100 TABLET ORAL at 00:45

## 2021-05-12 RX ADMIN — LIDOCAINE HYDROCHLORIDE,EPINEPHRINE BITARTRATE 5 ML: 20; .005 INJECTION, SOLUTION EPIDURAL; INFILTRATION; INTRACAUDAL; PERINEURAL at 20:23

## 2021-05-12 RX ADMIN — Medication 1 MILLI-UNITS/MIN: at 06:39

## 2021-05-12 RX ADMIN — SODIUM CHLORIDE, SODIUM LACTATE, POTASSIUM CHLORIDE, AND CALCIUM CHLORIDE: 600; 310; 30; 20 INJECTION, SOLUTION INTRAVENOUS at 20:41

## 2021-05-12 RX ADMIN — MORPHINE SULFATE 1 MG: 0.5 INJECTION, SOLUTION EPIDURAL; INTRATHECAL; INTRAVENOUS at 21:12

## 2021-05-12 RX ADMIN — FAMOTIDINE 20 MG: 10 INJECTION, SOLUTION INTRAVENOUS at 20:26

## 2021-05-12 RX ADMIN — TERBUTALINE SULFATE 0.25 MG: 1 INJECTION SUBCUTANEOUS at 13:45

## 2021-05-12 RX ADMIN — KETOROLAC TROMETHAMINE 30 MG: 30 INJECTION, SOLUTION INTRAMUSCULAR; INTRAVENOUS at 21:47

## 2021-05-12 RX ADMIN — CEFAZOLIN SODIUM 1 G: 1 INJECTION, POWDER, FOR SOLUTION INTRAMUSCULAR; INTRAVENOUS at 12:00

## 2021-05-12 RX ADMIN — Medication 6 ML: at 13:19

## 2021-05-12 RX ADMIN — LIDOCAINE HYDROCHLORIDE,EPINEPHRINE BITARTRATE 3 ML: 15; .005 INJECTION, SOLUTION EPIDURAL; INFILTRATION; INTRACAUDAL; PERINEURAL at 13:12

## 2021-05-12 RX ADMIN — MORPHINE SULFATE 1 MG: 0.5 INJECTION, SOLUTION EPIDURAL; INTRATHECAL; INTRAVENOUS at 21:14

## 2021-05-12 RX ADMIN — Medication 10 ML/HR: at 13:23

## 2021-05-12 RX ADMIN — MORPHINE SULFATE 1 MG: 0.5 INJECTION, SOLUTION EPIDURAL; INTRATHECAL; INTRAVENOUS at 21:21

## 2021-05-12 RX ADMIN — Medication 87.3 MILLI-UNITS/MIN: at 21:04

## 2021-05-12 RX ADMIN — OXYTOCIN 500 ML/HR: 10 INJECTION, SOLUTION INTRAMUSCULAR; INTRAVENOUS at 21:05

## 2021-05-12 RX ADMIN — CEFAZOLIN SODIUM 2 G: 10 INJECTION, POWDER, FOR SOLUTION INTRAVENOUS at 20:36

## 2021-05-12 RX ADMIN — Medication 6 MILLI-UNITS/MIN: at 14:44

## 2021-05-12 RX ADMIN — CEFAZOLIN SODIUM 1 G: 1 INJECTION, POWDER, FOR SOLUTION INTRAMUSCULAR; INTRAVENOUS at 04:15

## 2021-05-12 RX ADMIN — LIDOCAINE HYDROCHLORIDE,EPINEPHRINE BITARTRATE 2 ML: 20; .005 INJECTION, SOLUTION EPIDURAL; INFILTRATION; INTRACAUDAL; PERINEURAL at 20:36

## 2021-05-12 RX ADMIN — ACETAMINOPHEN 1000 MG: 500 TABLET, FILM COATED ORAL at 23:05

## 2021-05-12 NOTE — ANESTHESIA PREPROCEDURE EVALUATION
Relevant Problems   No relevant active problems       Anesthetic History   No history of anesthetic complications            Review of Systems / Medical History  Patient summary reviewed and pertinent labs reviewed    Pulmonary  Within defined limits                 Neuro/Psych   Within defined limits           Cardiovascular    Hypertension (chronic HTN)              Exercise tolerance: >4 METS     GI/Hepatic/Renal  Within defined limits              Endo/Other  Within defined limits           Other Findings              Physical Exam    Airway  Mallampati: II  TM Distance: 4 - 6 cm  Neck ROM: normal range of motion   Mouth opening: Normal     Cardiovascular    Rhythm: regular  Rate: normal         Dental  No notable dental hx       Pulmonary  Breath sounds clear to auscultation               Abdominal         Other Findings            Anesthetic Plan    ASA: 2, emergent  Anesthesia type: epidural            Anesthetic plan and risks discussed with: Patient and Spouse      Arrest of descent, plan urgent C/section with insitu epidural

## 2021-05-12 NOTE — PROGRESS NOTES
Charge nurse called Dr Alison Viera at [phone] to update to pt's progress. Post epidural. FHR deceleration. 5- MD called back, requesting bedside visit. Prolonged fhr deceleration. OB Hospitalist = dr Pedro Tripp at , arom / fse placed/ lr  Bolus/ trendelenburg positioning, 2 cms. Clear fluid.  ultrasound to confirm cephalic positioning. 65- Dr Rosemarie Guerrero at [facility] returning to baseline 140's. Fluid bolus continues/ o2 in place. Pt repositioned to the left side. iupc placed by dr Dannie Ordonez working effectively.

## 2021-05-12 NOTE — ANESTHESIA PROCEDURE NOTES
Epidural Block    Start time: 5/12/2021 1:12 PM  End time: 5/12/2021 1:16 PM  Reason for block: labor epidural  Staffing  Performed: attending   Anesthesiologist: Brian Fletcher MD  Preanesthetic Checklist  Completed: patient identified, IV checked, site marked, risks and benefits discussed, surgical consent, monitors and equipment checked, pre-op evaluation and timeout performed  Block Placement  Patient position: sitting  Prep: Betadine  Sterility prep: cap, drape, gloves, hand and mask  Sedation level: no sedation  Patient monitoring: frequent blood pressure checks and heart rate  Approach: right paramedian  Location: lumbar  Lumbar location: L2-L3  Epidural  Loss of resistance technique: air  Guidance: landmark technique  Needle  Needle type: Tuohy   Needle gauge: 17 G  Needle length: 9 cm  Needle insertion depth: 4 cm  Catheter type: end hole  Catheter size: 19 G  Catheter at skin depth: 10 cm  Test dose: negative  Medications Administered  Lidocaine-EPINEPHrine (XYLOCAINE) 1.5 %-1:200,000 Epidural, 3 mL  Assessment  Block outcome: pain improved  Number of attempts: 1  Procedure assessment: patient tolerated procedure well with no complications  Additional Notes  Timeout 1311

## 2021-05-12 NOTE — PROGRESS NOTES
Pt admitted to room 434 for induction due to Pre-E. Pt accompanied by her , Ernie Lin. Pt oriented to room and plan of care reviewed. EFM and [place] applied. PIV started and labs drawn. Consents signed and witnesses. Assessment complete. Cytotec induction started. Pt situated in bed with call bell in reach. No questions or concerns noted at this time.

## 2021-05-12 NOTE — PROGRESS NOTES
Recent epidural for pain with UCs followed by hypotension and deep variable FHR decelerations. AROM by Dr. Fernie Madden and FSE applied. Pit off. Terb given. Will continue to watch for now. Discussed events, discussed that we may need to proceed with CS if baby is unable to tolerate induction.

## 2021-05-12 NOTE — PROGRESS NOTES
At bedside. FHR back to baseline 140s with +STV and an accel, will restart pitocin. BPs still low for her, 110s-120s/ 50s. Will ask CRNA to administer ephedrine.

## 2021-05-12 NOTE — PROGRESS NOTES
0710 Bedside and Verbal shift change report given to NUSRAT Shay RN (oncoming nurse) by Enmanuel Oglesby RN (offgoing nurse). Report included the following information SBAR.   0827 Pt. Sitting up in bed. EFM tracing maternal HR.   0837 Pt. Requests to ambulate. EFM and toco removed. Domonique telemetry applied to allow pt. To walk. 1000 Pt. C/o pain 5/10 with contractions. SVE per RN 2/70/-2. Stadol given per orders. See MAR.   1035 Dr. Remy Cantu at bedside reviewing FHR tracing. SVE per Dr. Remy Cantu. 2/70/-2. Unable to AROM at this time. No new orders received. 1258 Pitocin 20mu/min infusing. SVE per RN 2/70/-2. Dr. Remy Cantu called per RN. Updated on pt. Status. Orders to placed epidural received. Pt. Agrees with POC. Fluid bolus started. [id number] Dr. Prabhu Puente and James Fish CRNA at bedside for epidural placement. Domonique telemetry not tracing FHR while pt sitting up for epidural placement. 1316 Epidural placed per Dr. Prabhu Puente. Pt. Repositioned to left tilt. 1331 Pt. State she feels nausea. [address] called for ephedrine. See anesthesia record. 1334 FHR decels noted. Domonique telemetry removed to better trace FHR rate. 9539-4140 FHR undetectable with EFM. Pt. Repositioned side to side. Pitocin off. Fluid bolus given. O2 10L/min via nonrebreather applied. Dr. Remy Cantu called per Katelyn Maurice, charge nurse. Dr. Cinda Lara called emergently to bedside. SVE Per Dr. Cinda Lara. 3/80/-3. AROM per Dr. Cinda Lara. Clear fluids noted. FSE and IUPC placed per Dr. Cinda Lara without difficult. Avalos catheter placed per RN. Bedside ultrasound per Dr. Cinda Lara. Baby is vertex. 1345 Terbutaline 0.25mg given. See MAR.  1346 Dr. Remy Cantu at bedside reviewing FHR tracing. 12 Dr. Remy Cantu at beside reviewing [address] tracing. Orders received to restart Pitocin at 6mu/min. Orders received to call CRNA for ephedrine. Marcell Carmichael called per RN.  1941 Pt. Repositioned to left side with peanut ball. 1215 [address] [facility],8W Dr. Remy Cantu at bedside reviewing FHR tracing.  SVE per  Ave 4cm. Hand presentation.   1826 SVE per RN 5/80/-2

## 2021-05-12 NOTE — PROGRESS NOTES
CTSP due to deceleration. FSE requested. Epidural recently placed and ephedrine was given x 1.    Cvx 80/3/-3  FSE placed  Large amount of clear fluid noted with FSE placement/AROM  IUPC placed  Due to high fetal station, US was performed and confirmed vtx presentation  Antonina CANADA

## 2021-05-12 NOTE — PROGRESS NOTES
4cm/-2. Fetal finger palpated. Discussed this with Alec Trujillo and her , hopefully with descent had will retract. FHR tracing reassuring. Pit at 5 mu/min will advance and continue induction. BPs better now.

## 2021-05-12 NOTE — H&P
History & Physical    Name: Zoya Parr MRN: [id number]  SSN: [id number]    YOB: 1980  Age: 45 y.o. Sex: female      Subjective:     Estimated Date of Delivery: 21  OB History    Para Term  AB Living   4 2 2   1 2   SAB TAB Ectopic Molar Multiple Live Births   1       0 2      # Outcome Date GA Lbr Damián/2nd Weight Sex Delivery Anes PTL Lv   4 Current            3 SAB 20           2 Term 10/31/15 39w5d 09:29 :46 7 lb 15.3 oz (3.61 kg) M VAGINAL DELI EPIDURAL AN, EPI N SANDEEP   1 Term 13 40w0d   F Vag-Spont EPI N SANDEEP       Ms. Solano is admitted with pregnancy at 38w5d for induction of labor due to chronic hypertension and recent increased BP > 140/90 for several days + swelling, 10# weight gain over the past 2 weeks. No severe HA, RUQ pain or visual chagnes. Prenatal course was complicated by AMA and cHTN. Please see prenatal records for details.     Past Medical History:   Diagnosis Date    Hypertension     Nuchal translucency of fetus on prenatal ultrasound 2020    SEE AMA OVERVIEW     Past Surgical History:   Procedure Laterality Date    HX WISDOM TEETH EXTRACTION       Social History     Occupational History    Not on file   Tobacco Use    Smoking status: Never Smoker    Smokeless tobacco: Never Used   Substance and Sexual Activity    Alcohol use: Not Currently    Drug use: Not on file    Sexual activity: Yes     Partners: Male     Birth control/protection: None     Family History   Problem Relation Age of Onset    Breast Cancer Maternal Grandmother 61    Heart Disease Maternal Grandfather     Heart Disease Paternal Grandfather     Cancer Paternal Grandfather     Hypertension Mother     Ovarian Cancer Neg Hx     Asthma Neg Hx     Bleeding Prob Neg Hx        Allergies   Allergen Reactions    Ibuprofen Swelling    Pcn [Penicillins] Hives     Allergic as a child     Prior to Admission medications    Medication Sig Start Date End Date Taking? Authorizing Provider   ferrous sulfate 325 mg (65 mg iron) tablet Take  by mouth Daily (before breakfast). Yes Provider, Historical   cholecalciferol, vitamin D3, (Vitamin D3) 50 mcg (2,000 unit) tab Take  by mouth. Yes Provider, Historical   calcium carbonate (OS-KELLY) 500 mg calcium (1,250 mg) tablet Take  by mouth daily. Yes Provider, Historical   loratadine (Claritin) 10 mg tablet Take 10 mg by mouth. Yes Provider, Historical   PNV No.40-Iron Fum-FA Cmb No.1 27-1 mg tab Take  by mouth. Yes Provider, Historical        Review of Systems: Pertinent items are noted in the History of Present Illness. Objective:     Vitals:  Vitals:    05/12/21 1319 05/12/21 1322 05/12/21 1323 05/12/21 1324   BP: 130/81 132/70 119/67 118/69   Pulse: 70 75 82 76   Resp:       Temp:            Physical Exam:  Patient without distress. Heart: Regular rate and rhythm  Lung: clear to auscultation throughout lung fields, no wheezes, no rales, no rhonchi and normal respiratory effort  Abdomen: soft, nontender  Cervical Exam: 1.5/50/-3  Membranes:  intact, attempted ROM but unsuccesful  Fetal Heart Rate: Reactive          Prenatal Labs:   Lab Results   Component Value Date/Time    ABO/Rh(D) B POSITIVE 05/11/2021 08:02 PM    Rubella, External Immune 03/16/2015    HBsAg, External Negative 03/16/2015    HIV, External Negative 03/16/2015    RPR, External Negative 03/16/2015    ABO,Rh B Positive 03/16/2015    GrBStrep, External POSITIVE/resist to Clindamycin 10/15/2015       Impression/Plan:     Active Problems:    Normal labor (5/12/2021)      Hypertension affecting pregnancy (5/12/2021)         Plan: [address] [place] [address] for induction of labor. Group B Strep positive, will treat prophylactically with Ancef.

## 2021-05-13 PROBLEM — O13.3 GESTATIONAL HYPERTENSION, THIRD TRIMESTER: Status: RESOLVED | Noted: 2021-05-11 | Resolved: 2021-05-13

## 2021-05-13 PROBLEM — O16.9 HYPERTENSION AFFECTING PREGNANCY: Status: RESOLVED | Noted: 2021-05-12 | Resolved: 2021-05-13

## 2021-05-13 LAB
HCT VFR BLD AUTO: 32 % (ref 35.8–46.3)
HGB BLD-MCNC: 10.6 G/DL (ref 11.7–15.4)

## 2021-05-13 PROCEDURE — 74011250636 HC RX REV CODE- 250/636: Performed by: OBSTETRICS & GYNECOLOGY

## 2021-05-13 PROCEDURE — 85018 HEMOGLOBIN: CPT

## 2021-05-13 PROCEDURE — 74011250637 HC RX REV CODE- 250/637: Performed by: ANESTHESIOLOGY

## 2021-05-13 PROCEDURE — 74011000258 HC RX REV CODE- 258: Performed by: OBSTETRICS & GYNECOLOGY

## 2021-05-13 PROCEDURE — 74011250637 HC RX REV CODE- 250/637: Performed by: OBSTETRICS & GYNECOLOGY

## 2021-05-13 PROCEDURE — 2709999900 HC NON-CHARGEABLE SUPPLY

## 2021-05-13 PROCEDURE — 74011250636 HC RX REV CODE- 250/636: Performed by: ANESTHESIOLOGY

## 2021-05-13 PROCEDURE — 36415 COLL VENOUS BLD VENIPUNCTURE: CPT

## 2021-05-13 PROCEDURE — 65270000029 HC RM PRIVATE

## 2021-05-13 PROCEDURE — 74011000250 HC RX REV CODE- 250: Performed by: ANESTHESIOLOGY

## 2021-05-13 RX ORDER — OXYCODONE HYDROCHLORIDE 5 MG/1
10 TABLET ORAL
Status: DISCONTINUED | OUTPATIENT
Start: 2021-05-13 | End: 2021-05-13 | Stop reason: SDUPTHER

## 2021-05-13 RX ORDER — ACETAMINOPHEN 500 MG
1000 TABLET ORAL
Status: DISCONTINUED | OUTPATIENT
Start: 2021-05-13 | End: 2021-05-14 | Stop reason: ALTCHOICE

## 2021-05-13 RX ORDER — ONDANSETRON 8 MG/1
8 TABLET, ORALLY DISINTEGRATING ORAL
Status: DISCONTINUED | OUTPATIENT
Start: 2021-05-13 | End: 2021-05-15 | Stop reason: HOSPADM

## 2021-05-13 RX ORDER — KETOROLAC TROMETHAMINE 30 MG/ML
30 INJECTION, SOLUTION INTRAMUSCULAR; INTRAVENOUS
Status: DISPENSED | OUTPATIENT
Start: 2021-05-13 | End: 2021-05-14

## 2021-05-13 RX ORDER — NALOXONE HYDROCHLORIDE 0.4 MG/ML
0.1 INJECTION, SOLUTION INTRAMUSCULAR; INTRAVENOUS; SUBCUTANEOUS
Status: ACTIVE | OUTPATIENT
Start: 2021-05-13 | End: 2021-05-14

## 2021-05-13 RX ORDER — SODIUM CHLORIDE, SODIUM LACTATE, POTASSIUM CHLORIDE, CALCIUM CHLORIDE 600; 310; 30; 20 MG/100ML; MG/100ML; MG/100ML; MG/100ML
50 INJECTION, SOLUTION INTRAVENOUS CONTINUOUS
Status: ACTIVE | OUTPATIENT
Start: 2021-05-13 | End: 2021-05-14

## 2021-05-13 RX ORDER — FUROSEMIDE 20 MG/1
20 TABLET ORAL DAILY
Status: DISCONTINUED | OUTPATIENT
Start: 2021-05-13 | End: 2021-05-15 | Stop reason: HOSPADM

## 2021-05-13 RX ORDER — POLYETHYLENE GLYCOL 3350 17 G/17G
17 POWDER, FOR SOLUTION ORAL DAILY
Status: DISCONTINUED | OUTPATIENT
Start: 2021-05-13 | End: 2021-05-15 | Stop reason: HOSPADM

## 2021-05-13 RX ORDER — DIPHENHYDRAMINE HYDROCHLORIDE 50 MG/ML
12.5 INJECTION, SOLUTION INTRAMUSCULAR; INTRAVENOUS
Status: DISPENSED | OUTPATIENT
Start: 2021-05-13 | End: 2021-05-14

## 2021-05-13 RX ORDER — HYDROMORPHONE HYDROCHLORIDE 1 MG/ML
1 INJECTION, SOLUTION INTRAMUSCULAR; INTRAVENOUS; SUBCUTANEOUS
Status: ACTIVE | OUTPATIENT
Start: 2021-05-13 | End: 2021-05-14

## 2021-05-13 RX ADMIN — CEFAZOLIN SODIUM 1 G: 1 INJECTION, POWDER, FOR SOLUTION INTRAMUSCULAR; INTRAVENOUS at 00:04

## 2021-05-13 RX ADMIN — DIPHENHYDRAMINE HYDROCHLORIDE 12.5 MG: 50 INJECTION, SOLUTION INTRAMUSCULAR; INTRAVENOUS at 01:07

## 2021-05-13 RX ADMIN — FUROSEMIDE 20 MG: 20 TABLET ORAL at 09:33

## 2021-05-13 RX ADMIN — ACETAMINOPHEN 1000 MG: 500 TABLET, FILM COATED ORAL at 18:28

## 2021-05-13 RX ADMIN — DOCUSATE SODIUM 100 MG: 100 CAPSULE ORAL at 18:28

## 2021-05-13 RX ADMIN — ACETAMINOPHEN 1000 MG: 500 TABLET, FILM COATED ORAL at 11:42

## 2021-05-13 RX ADMIN — ONDANSETRON 4 MG: 2 INJECTION INTRAMUSCULAR; INTRAVENOUS at 06:07

## 2021-05-13 RX ADMIN — KETOROLAC TROMETHAMINE 30 MG: 30 INJECTION, SOLUTION INTRAMUSCULAR; INTRAVENOUS at 03:53

## 2021-05-13 RX ADMIN — CEFAZOLIN SODIUM 1 G: 1 INJECTION, POWDER, FOR SOLUTION INTRAMUSCULAR; INTRAVENOUS at 06:07

## 2021-05-13 RX ADMIN — ACETAMINOPHEN 1000 MG: 500 TABLET, FILM COATED ORAL at 06:06

## 2021-05-13 RX ADMIN — SODIUM CHLORIDE, SODIUM LACTATE, POTASSIUM CHLORIDE, AND CALCIUM CHLORIDE 50 ML/HR: 600; 310; 30; 20 INJECTION, SOLUTION INTRAVENOUS at 01:08

## 2021-05-13 RX ADMIN — PROMETHAZINE HYDROCHLORIDE 12.5 MG: 25 INJECTION INTRAMUSCULAR; INTRAVENOUS at 07:37

## 2021-05-13 RX ADMIN — OXYCODONE HYDROCHLORIDE 10 MG: 5 TABLET ORAL at 19:04

## 2021-05-13 RX ADMIN — POLYETHYLENE GLYCOL 3350 17 G: 17 POWDER, FOR SOLUTION ORAL at 11:43

## 2021-05-13 NOTE — OP NOTES
Section Delivery Operative Report       Patient: Cory Tomlin MRN: [id number]  SSN: [id number]    YOB: 1980  Age: 45 y.o. Sex: female       Date of Procedure: 2021     Preoperative Diagnosis: Failure to progress in labor [O62.2]    Postoperative Diagnosis: * No post-op diagnosis entered *    Procedure: Low Cervical Transverse Procedure(s):   SECTION    Surgeon(s):  Kim Dorado MD    Anesthesia: Spinal    Prophylactic Antibiotics: zithromycin and Ancef    Estimated Blood Loss:  700 ml     Information for the patient's :  Maricel Connor [[id number]]   Delivery of a 7 lb 5.5 oz (3.33 kg) female infant on 2021 at 9:04 PM. Apgars were 9  and 9 . Umbilical Cord: 3 Vessels     Umbilical Cord Events: None     Placenta: Spontaneous removal with Normal;Intact appearance. Amniotic Fluid Volume:        Amniotic Fluid Description:  Clear         Specimens:   ID Type Source Tests Collected by Time Destination   1 :  Placenta Uterus  Cheri Connolly MD 2021 Discarded               Complications:  none    Procedure Details: The patient was taken to the operating room, where Spinal anesthesia was administered and found to be adequate. Urinary catheter had been placed using sterile technique. The patient was prepped and draped in the normal sterile fashion. Time out was performed confirming the patient, procedure and any pertinent concerns. The abdomen was entered using the Pfannenstiel technique. The peritoneum was entered sharply well superior to the bladder. The bladder blade was then inserted. The vesicouterine and peritoneum was identified and entered sharply with Metzenbaum scissors. The bladder flap was then created sharply and the bladder blade was reinserted. A low transverse uterine incision was made with the scalpel and extended laterally with blunt finger dissection. Uterine cavity entered. Hand introduced and position noted. Scalp electrode still attached, so I elevated the head and cut the wire, and The babys head was then delivered atraumatically. The nose and mouth were suctioned. The remainder of the delivery was carried out in the usual fashion without difficulty. The cord was clamped and cut and the baby was handed off to the waiting  care unit staff. I changed gloves. Cord blood and as obtained. Placenta was then expressed from the uterus. The uterus was exteriorized and cleared of all clots and debris. The uterine incision was closed with number 1 Chromic in running locking fashion with good hemostasis assured. Three additional fig-8s placed for hemostasis. The posterior cul-de-sac was irrigated with warm normal saline. Good hemostasis was again reassured and the uterus was returned to the abdomen. The anterior pelvis was irrigated with warm normal saline and good hemostasis was again reassured throughout. Peritoneum closed with running chromic. The rectus muscles were reapproximated in the midline with a series of simple stitches with 0 chromic. The fascia was closed with 0 PDS in a running fashion. Good hemostasis was assured. The subcuticular layers were reapproximated with 2-0 plain gut in running fashion. The skin was closed with a 4-0 Monocryl in a subcuticular fashion. Skin adhesive was applied. The patient tolerated the procedure well. Sponge, lap, and needle counts were correct times three and the patient and baby were taken to recovery/postpartum room in stable condition.     Signed By: Henri Weaver MD     May 12, 2021

## 2021-05-13 NOTE — PROGRESS NOTES
SBAR IN Report: Mother    Verbal report received from Mar Musa RN on this patient, who is now being transferred from MIU for routine progression of care. The patient is wearing a green \"Anesthesia-Duramorph\" band. Report consisted of patient's Situation, Background, Assessment and Recommendations (SBAR).  ID bands were compared with the identification form, and verified with the patient and transferring nurse. Information from the SBAR, Kardex, OR Summary, Intake/Output, MAR and Recent Results and the [facility] Report was reviewed with the transferring nurse; opportunity for questions and clarification provided.

## 2021-05-13 NOTE — PROGRESS NOTES
05/12/21 2012   Cervical Exam   Dilation (cm) 6   Station -2   Vaginal exam done byAriel Dorado   Baby Position Vertex   Presentation Other (comment)     Now 5-6 cm, still fairly high. Right hand is fully in the birth canal.  Either the left hand or part of face is also palpated posterior to the right hand. Findings and need to proceed with CS discussed. The procedure was reviewed in detail as well as the risks of bleeding, infection, DVT and potential surgical complications involving the bladder, ureters, colon or intestines. Also the alternatives,  benefits, recovery and follow-up. Prevention of SSI discussed as indicated. All of her questions were answered.     Mary Montilla MD  May 12, 2021

## 2021-05-13 NOTE — PROGRESS NOTES
SBAR OUT Report: Mother    Verbal report given to [facility] and the [facility] JU[facility] - GLEN VALENTINE RN (full name & credentials) on this patient, who is now being transferred to MIU (unit) for routine progression of care. The patient is wearing a green \"Anesthesia-Duramorph\" band. Report consisted of patient's Situation, Background, Assessment and Recommendations (SBAR). [place] ID bands were compared with the identification form, and verified with the patient and receiving nurse. Information from the SBAR and Kardex and the [facility] [employer] Report was reviewed with the receiving nurse; opportunity for questions and clarification provided.

## 2021-05-13 NOTE — L&D DELIVERY NOTE
Delivery Summary    Patient: Julia Resendiz MRN: [id number]  SSN: [id number]    YOB: 1980  Age: 45 y.o.   Sex: female        Information for the patient's :  Kat Escobedo [[id number]]       Labor Events:    Labor: No    Steroids: None   Cervical Ripening Date/Time: 2021 8:15 PM   Cervical Ripening Type: Misoprostol   Antibiotics During Labor: Yes   Rupture Identifier:      Rupture Date/Time: 2021 1:41 PM   Rupture Type: AROM   Amniotic Fluid Volume:      Amniotic Fluid Description: Clear    Amniotic Fluid Odor: None    Induction: Prostaglandins;AROM       Induction Date/Time: 2021 8:15 PM    Indications for Induction: Chronic Hypertension    Augmentation: None   Augmentation Date/Time:      Indications for Augmentation:     Labor complications: None       Additional complications:        Delivery Events:  Indications For Episiotomy:     Episiotomy:     Perineal Laceration(s):     Repaired:     Periurethral Laceration Location:      Repaired:     Labial Laceration Location:     Repaired:     Sulcal Laceration Location:     Repaired:     Vaginal Laceration Location:     Repaired:     Cervical Laceration Location:     Repaired:     Repair Suture:     Number of Repair Packets:     Estimated Blood Loss (ml):  ml   Quantitaive Blood Loss (ml):             Delivery Date: 2021    Delivery Time: 9:04 PM   Delivery Type: , Low Transverse     Details    Trial of Labor: Yes   Primary/Repeat: Primary   Priority: Routine   Indications:  Failure to Progress       Sex:  Female     Gestational Age: 44w7d  Delivery Clinician:  Neftali Dodd  Living Status: Living   Delivery Location: L&D 434          APGARS  One minute Five minutes Ten minutes   Skin color: 1   1        Heart rate: 2   2        Grimace: 2   2        Muscle tone: 2   2        Breathin   2        Totals: 9   9          Presentation: Compound    Position:   Occiput Anterior  Resuscitation Method:  Suctioning-bulb; Tactile Stimulation     Meconium Stained: None      Cord Information: 3 Vessels  Complications: None  Cord around:    Delayed cord clamping? Yes  Cord clamped date/time:2021  9:04 PM  Disposition of Cord Blood: Lab    Blood Gases Sent?: Yes    Placenta:  Date/Time: 2021  9:05 PM  Removal: Spontaneous      Appearance: Normal;Intact      Measurements:  Birth Weight: 7 lb 5.5 oz (3.33 kg)      Birth Length: 52 cm      Head Circumference: 35.5 cm      Chest Circumference: 32 cm     Abdominal Girth:       Other Providers:   Fili COLEMAN;JS MULLINS;NAYE TORRES;SHWETA WOLF;Radha LEDESMA IV Truxton, Obstetrician;Primary Nurse;Primary  Nurse;Neonatologist;Anesthesiologist;Crna             Group B Strep:   Lab Results   Component Value Date/Time    GrBStrep, External POSITIVE/resist to Clindamycin 10/15/2015     Information for the patient's :  Estela Gautam [[id number]]   No results found for: Mario Yarbrough, ABORHEXT, ABORH     Recent Labs     21   PCO2CB 33 45   PO2CB 21 7   HCO3I  --  23.7   SO2I  --  5.1*   IBD 3.3 2.5   SPECTI VENOUS CORD ARTERIAL CORD   PHICB 7.41* 7.33

## 2021-05-13 NOTE — PROGRESS NOTES
Admission assessment complete as noted. Patient oriented to room and unit. Plan of care reviewed and patient verbalizes understanding. Questions encouraged and answered. Patent encouraged to call for needs or concerns. Safety Teaching reviewed:   1. Hand hygiene prior to handling the infant. 2. Use of bulb syringe. 3. Bracelets with matching numbers are placed on mother and infant  4. An infant security tag  [facility]) is placed on the infant's ankle and monitored  5. All OB nurses wear pink Employee badges - do not give your baby to anyone without proper identification. 6. Never leave the baby alone in the room. 7. The infant should be placed on their back to sleep. on a firm mattress. No toys should be placed in the crib. (safe sleep video offered to view)  8. Never shake the baby (video offered to view)  9. Infant fall prevention - do not sleep with the baby, and place the baby in the crib while ambulating. 8. Mother and Baby Care booklet given to Mother.

## 2021-05-13 NOTE — PROGRESS NOTES
Anesthesiology  Post-op Note    Post-op day 1 s/p  via epidural with neuraxial opioids for post-op pain management. Visit Vitals  /76 (BP 1 Location: Left upper arm, BP Patient Position: At rest)   Pulse 68   Temp 36.7 °C (98.1 °F)   Resp 18   LMP 2020   SpO2 96%   Breastfeeding Unknown       Airway patent, patient appropriately hydrated and appears euvolemic. Patient is Alert and oriented. Pain is well controlled. Pruritus is present but improving slightly over yesterday. Prns ordered. .  Nausea is moderately controlled. No complaints about back or site of injection. Motor and sensory function has returned to baseline in lower extremities. Patient is satisfied with anesthetic and reports no complications. Continue current orders, then initiate surgeon's orders for pain management 24 hours after . If the nausea continues I would add ondansetron to the phenergan Prn. Follow up per surgeon.

## 2021-05-13 NOTE — PROGRESS NOTES
Progress Note                               Patient: King Shaji MRN: [id number]  SSN: [id number]    YOB: 1980  Age: 45 y.o. Sex: female      1 Day Post-Op     Subjective:     Patient overall doing well postpartum but does c/o feeling lightheaded and a little nauseaous this am.  Did eat breakfast and has kept this down. No flatus yet. Avalos catheter still in place. States she feels like if she stood up she would pass out right now. Did receive IV Phenergan as well as Zofran earlier this morning and has had difficulty staying awake since. Pain controlled on current medications but has attempted ambulation yet. Lochia is appropriate. Denies any CP/palpitations. Denies HA, SOB/CP, RUQ pain, vision changes. Objective:     Patient Vitals for the past 12 hrs:   Temp Pulse Resp BP SpO2   21 0724 98.1 °F (36.7 °C) 63 18 124/79 96 %   21 0607   18  96 %   21 0445 98.1 °F (36.7 °C) 68 16 130/76 96 %   21 0245   18  96 %   21 0040 97.9 °F (36.6 °C) 74 18 (!) 140/76 97 %   21 2330  63 18 (!) 141/77    21 2255  70  (!) 141/75 96 %   21 2240  70  134/67 98 %   21 2235  78  134/66 97 %   21 2230  70  136/70 98 %   21 2225  65  (!) 136/92 97 %       Temp (24hrs), Av.9 °F (36.6 °C), Min:97.5 °F (36.4 °C), Max:98.1 °F (36.7 °C)      Date 21 - 21 - 21 0659   Shift 0009-4016 3113-8464 24 Hour Total 6686-7891 3608-4555 24 Hour Total   INTAKE   P.O.  400 400        P. O.  400 400      I.V.  2033        I.V.  533 533        Volume (lactated Ringers infusion)  1500 1500      Shift Total  2433 2433      OUTPUT   Urine 700 1425 2125        Urine Output  625 625        Urine Output (mL) (Urinary Catheter 21 Avalos)       Blood  540 540        Quantitative Blood Loss (mL)  540 540      Shift Total 700 1965 2664      NET -700 468 -836      Weight (kg) Physical Exam:    Constitutional: She appears well-developed and well-nourished. No distress.    HENT:    Head: Normocephalic and atraumatic.    Cardiovascular: RRR  Pulmonary/Chest: CTAB  Abd: S/appropriately TTP/ND, BS hypooactive, fundus firm at umbilicus, Incision c/d/i, no erythema/induration  Ext: No c/c/e      Lab/Data Review:  PIH:   Recent Labs     21  0703 21   WBC  --  10.9   HGB 10.6* 11.7   HCT 32.0* 34.7*   PLT  --  169    No results for input(s): LDH, URICA, MG, PUQ in the last 72 hours. GBS:   Lab Results   Component Value Date/Time    GrBStrep, External POSITIVE/resist to Clindamycin 10/15/2015     Blood Type:   Lab Results   Component Value Date/Time    ABO/Rh(D) B POSITIVE 2021 08:02 PM    ABO,Rh B Positive 2015        Assessment and Plan:     45 y.o.  POD# 1 from primary LTCS at 38w5d:    1) Postop: Continue routine care. Will DC IV phenergan; Zofran 8mg PRN. Keep stanley in for a few more hrs until able/ready to ambulate. 2) Rh pos, Rub imm, breast feeding   3) CHTN:  Bps nl-mid on no antihypertensives. Previously on HCTZ --quit w/ UPT. No recent HELLP labs/P:C.  Rx Lasix x 5 days pp for PP PreE ppx.         Signed By: Farhan Ortiz MD     May 13, 2021

## 2021-05-13 NOTE — ANESTHESIA POSTPROCEDURE EVALUATION
Procedure(s):   SECTION.     epidural    Anesthesia Post Evaluation      Multimodal analgesia: multimodal analgesia used between 6 hours prior to anesthesia start to PACU discharge  Patient location during evaluation: bedside  Patient participation: complete - patient participated  Level of consciousness: awake  Pain management: adequate  Airway patency: patent  Anesthetic complications: no  Cardiovascular status: acceptable  Respiratory status: acceptable  Hydration status: acceptable  Post anesthesia nausea and vomiting:  none

## 2021-05-14 PROCEDURE — 74011250637 HC RX REV CODE- 250/637: Performed by: OBSTETRICS & GYNECOLOGY

## 2021-05-14 PROCEDURE — 65270000029 HC RM PRIVATE

## 2021-05-14 PROCEDURE — 2709999900 HC NON-CHARGEABLE SUPPLY

## 2021-05-14 RX ORDER — ACETAMINOPHEN 325 MG/1
650 TABLET ORAL EVERY 6 HOURS
Status: DISCONTINUED | OUTPATIENT
Start: 2021-05-14 | End: 2021-05-15 | Stop reason: HOSPADM

## 2021-05-14 RX ADMIN — FUROSEMIDE 20 MG: 20 TABLET ORAL at 09:31

## 2021-05-14 RX ADMIN — PRENATAL VIT W/ FE FUMARATE-FA TAB 27-0.8 MG 1 TABLET: 27-0.8 TAB at 09:31

## 2021-05-14 RX ADMIN — ACETAMINOPHEN 650 MG: 325 TABLET, FILM COATED ORAL at 09:31

## 2021-05-14 RX ADMIN — DOCUSATE SODIUM 100 MG: 100 CAPSULE ORAL at 09:32

## 2021-05-14 RX ADMIN — LORATADINE 10 MG: 10 TABLET ORAL at 09:32

## 2021-05-14 RX ADMIN — OXYCODONE HYDROCHLORIDE 10 MG: 5 TABLET ORAL at 12:54

## 2021-05-14 RX ADMIN — POLYETHYLENE GLYCOL 3350 17 G: 17 POWDER, FOR SOLUTION ORAL at 09:32

## 2021-05-14 RX ADMIN — SIMETHICONE 80 MG: 80 TABLET, CHEWABLE ORAL at 07:30

## 2021-05-14 RX ADMIN — SIMETHICONE 80 MG: 80 TABLET, CHEWABLE ORAL at 21:29

## 2021-05-14 RX ADMIN — OXYCODONE HYDROCHLORIDE 10 MG: 5 TABLET ORAL at 01:26

## 2021-05-14 RX ADMIN — OXYCODONE HYDROCHLORIDE 10 MG: 5 TABLET ORAL at 17:40

## 2021-05-14 RX ADMIN — SIMETHICONE 80 MG: 80 TABLET, CHEWABLE ORAL at 17:40

## 2021-05-14 RX ADMIN — ACETAMINOPHEN 650 MG: 325 TABLET, FILM COATED ORAL at 15:08

## 2021-05-14 RX ADMIN — DOCUSATE SODIUM 100 MG: 100 CAPSULE ORAL at 17:40

## 2021-05-14 RX ADMIN — OXYCODONE HYDROCHLORIDE 10 MG: 5 TABLET ORAL at 21:34

## 2021-05-14 RX ADMIN — SIMETHICONE 80 MG: 80 TABLET, CHEWABLE ORAL at 01:27

## 2021-05-14 RX ADMIN — ACETAMINOPHEN 650 MG: 325 TABLET, FILM COATED ORAL at 21:29

## 2021-05-14 RX ADMIN — SIMETHICONE 80 MG: 80 TABLET, CHEWABLE ORAL at 12:54

## 2021-05-14 RX ADMIN — OXYCODONE HYDROCHLORIDE 10 MG: 5 TABLET ORAL at 07:30

## 2021-05-14 NOTE — PROGRESS NOTES
Post-Operative Day Number 2 Progress Note  Jerrydavid Daigle  [id number]    Patient doing well post-op day 2 from  delivery without significant complaints. Pain controlled on current medication. Voiding without difficulty, normal lochia. Tolerating regular diet    Vitals:    Patient Vitals for the past 8 hrs:   BP Temp Pulse Resp SpO2   21 1058 134/83 98 °F (36.7 °C) 82 16 96 %   21 0713 129/82 98 °F (36.7 °C) 79 16 96 %     Temp (24hrs), Av.9 °F (36.6 °C), Min:97.6 °F (36.4 °C), Max:98.1 °F (36.7 °C)      Vital signs stable, afebrile. Exam:  Patient without distress. Abdomen soft, fundus firm at level of umbilicus, nontender. Incision dry and                      clean without erythema. Labs: No results found for this or any previous visit (from the past 24 hour(s)). Assessment and Plan:  Patient appears to be having uncomplicated post- course. Continue routine post-op care and maternal education.

## 2021-05-14 NOTE — LACTATION NOTE
In to see mom and infant earlier and had reviewed the expectations of the first 24 hours and informed mom I would be back for next feeding. Assisted mom with attempt to latch infant on the right breast in the cross cradle hold. Infant would latch and take a few sucks and go to sleep. After several attempts mom just held infant skin to skin. Reviewed the second night of life. Lactation consultant will follow up tomorrow.

## 2021-05-14 NOTE — LACTATION NOTE

## 2021-05-14 NOTE — LACTATION NOTE
In to follow up with mom and infant. Mom stated that infant did cluster feed some during the night but she was concerned so she has started pumping. First instructed mom to [facility] \" prior to attempt. Assisted mom with attempt to latch infant on her left breast in the cross cradle hold. Infant latched and started to suck rhythmically. Infant nursed for 13 minutes and came off the breast content. Mom burped infant and placed her to her right breast in the cross cradle hold. Infant latched and nursed for 5 minutes and fell asleep. Mom happy with how well infant latched and nursed this feeding. Lactation consultant will follow up tomorrow.

## 2021-05-14 NOTE — PROGRESS NOTES
Chart reviewed - no needs identified. SW met with patient while social distancing w/appropriate PPE. Patient denies any history of postpartum depression/anxiety. Patient given informational packet on  mood & anxiety disorders (resources/education). Family denies any additional needs from  at this time. Family has 's contact information should any needs/questions arise.     LUIZ Denney  [facility]   [phone]

## 2021-05-15 VITALS
DIASTOLIC BLOOD PRESSURE: 71 MMHG | OXYGEN SATURATION: 97 % | TEMPERATURE: 97.8 F | RESPIRATION RATE: 17 BRPM | SYSTOLIC BLOOD PRESSURE: 116 MMHG | HEART RATE: 77 BPM

## 2021-05-15 PROCEDURE — 74011250637 HC RX REV CODE- 250/637: Performed by: OBSTETRICS & GYNECOLOGY

## 2021-05-15 PROCEDURE — 74011250636 HC RX REV CODE- 250/636: Performed by: OBSTETRICS & GYNECOLOGY

## 2021-05-15 PROCEDURE — 90715 TDAP VACCINE 7 YRS/> IM: CPT | Performed by: OBSTETRICS & GYNECOLOGY

## 2021-05-15 RX ORDER — FUROSEMIDE 20 MG/1
TABLET ORAL
Qty: 3 TAB | Refills: 0 | Status: SHIPPED | OUTPATIENT
Start: 2021-05-15 | End: 2021-05-27

## 2021-05-15 RX ORDER — OXYCODONE HYDROCHLORIDE 10 MG/1
10 TABLET ORAL
Qty: 24 TAB | Refills: 0 | Status: SHIPPED | OUTPATIENT
Start: 2021-05-15 | End: 2021-05-22

## 2021-05-15 RX ADMIN — DOCUSATE SODIUM 100 MG: 100 CAPSULE ORAL at 09:18

## 2021-05-15 RX ADMIN — PRENATAL VIT W/ FE FUMARATE-FA TAB 27-0.8 MG 1 TABLET: 27-0.8 TAB at 09:18

## 2021-05-15 RX ADMIN — LORATADINE 10 MG: 10 TABLET ORAL at 09:18

## 2021-05-15 RX ADMIN — ACETAMINOPHEN 650 MG: 325 TABLET, FILM COATED ORAL at 05:03

## 2021-05-15 RX ADMIN — OXYCODONE HYDROCHLORIDE 5 MG: 5 TABLET ORAL at 11:23

## 2021-05-15 RX ADMIN — ACETAMINOPHEN 650 MG: 325 TABLET, FILM COATED ORAL at 11:23

## 2021-05-15 RX ADMIN — SIMETHICONE 80 MG: 80 TABLET, CHEWABLE ORAL at 09:18

## 2021-05-15 RX ADMIN — POLYETHYLENE GLYCOL 3350 17 G: 17 POWDER, FOR SOLUTION ORAL at 09:18

## 2021-05-15 RX ADMIN — FUROSEMIDE 20 MG: 20 TABLET ORAL at 09:18

## 2021-05-15 RX ADMIN — OXYCODONE HYDROCHLORIDE 10 MG: 5 TABLET ORAL at 05:04

## 2021-05-15 RX ADMIN — TETANUS TOXOID, REDUCED DIPHTHERIA TOXOID AND ACELLULAR PERTUSSIS VACCINE, ADSORBED 0.5 ML: 5; 2.5; 8; 8; 2.5 SUSPENSION INTRAMUSCULAR at 09:28

## 2021-05-15 NOTE — PROGRESS NOTES
Called Dr Maegan Finn to inform him the patient is still itching and not comfortable. She is unable to stay awake with the benadryl medication to care for her . MD stated she could have Nubain 5 mg iv Now.

## 2021-05-15 NOTE — DISCHARGE INSTRUCTIONS
Patient Education      Discharge instruction to follow: Activity: Pelvis rest for 6 weeks, nothing in the vagina     No heavy lifting over 15 lbs or strenuous exercise for 4-6 weeks     No driving for 2 weeks     No push/pull motion such as sweeping or vacuuming for 4 weeks     No tub baths for 6 weeks     section keep incision clean and dry, may shower as normal with soap and water. Inspect incision every day for signs of infection listed below. Continue to use tootie-bottle with every void or bowel movement until comfortable stopping. Change sanitary pad after each urination or bowel movement. Call MD for the following:      Fever over 100.4 F; pain not relieved by medication; foul smelling vaginal discharge or increase in vaginal bleeding. Redness, swelling, or drainage from  incision. Take medication as prescribed. Follow up with MD as order.  Section: What to Expect at [address] [place] [address]     A  section, or , is surgery to deliver your baby through a cut that the doctor makes in your lower belly and uterus. The cut is called an incision. You may have some pain in your lower belly and need pain medicine for 1 to 2 weeks. You can expect some vaginal bleeding for several weeks. You will probably need about 6 weeks to fully recover. It's important to take it easy while the incision heals. Avoid heavy lifting, strenuous activities, and exercises that strain the belly muscles while you recover. Ask a family member or friend for help with housework, cooking, and shopping. This care sheet gives you a general idea about how long it will take for you to recover. But each person recovers at a different pace. Follow the steps below to get better as quickly as possible. How can you care for yourself at home? Activity    · Rest when you feel tired. Getting enough sleep will help you recover.     · Try to walk each day.  Start by walking a little more than you did the day before. Bit by bit, increase the amount you walk. Walking boosts blood flow and helps prevent pneumonia, constipation, and blood clots.     · Avoid strenuous activities, such as bicycle riding, jogging, weightlifting, and aerobic exercise, for 6 weeks or until your doctor says it is okay.     · Until your doctor says it is okay, do not lift anything heavier than your baby.     · Do not do sit-ups or other exercises that strain the belly muscles for 6 weeks or until your doctor says it is okay.     · Hold a pillow over your incision when you cough or take deep breaths. This will support your belly and decrease your pain.     · You may shower as usual. Pat the incision dry when you are done.     · You will have some vaginal bleeding. Wear sanitary pads. Do not douche or use tampons until your doctor says it is okay.     · Ask your doctor when you can drive again.     · You will probably need to take at least 6 weeks off work. It depends on the type of work you do and how you feel.     · Ask your doctor when it is okay for you to have sex. Diet    · You can eat your normal diet. If your stomach is upset, try bland, low-fat foods like plain rice, broiled chicken, toast, and yogurt.     · Drink plenty of fluids (unless your doctor tells you not to).     · You may notice that your bowel movements are not regular right after your surgery. This is common. Try to avoid constipation and straining with bowel movements. You may want to take a fiber supplement every day. If you have not had a bowel movement after a couple of days, ask your doctor about taking a mild laxative.     · If you are breastfeeding, limit alcohol. Alcohol can cause a lack of energy and other health problems for the baby when a breastfeeding woman drinks heavily. It can also get in the way of a mom's ability to feed her baby or to care for the child in other ways. There isn't a lot of research about exactly how much alcohol can harm a baby.  Having no alcohol is the safest choice for your baby. If you choose to have a drink now and then, have only one drink, and limit the number of occasions that you have a drink. Wait to breastfeed at least 2 hours after you have a drink to reduce the amount of alcohol the baby may get in the milk. Medicines    · Your doctor will tell you if and when you can restart your medicines. He or she will also give you instructions about taking any new medicines.     · If you take aspirin or some other blood thinner, ask your doctor if and when to start taking it again. Make sure that you understand exactly what your doctor wants you to do.     · Take pain medicines exactly as directed. ? If the doctor gave you a prescription medicine for pain, take it as prescribed. ? If you are not taking a prescription pain medicine, ask your doctor if you can take an over-the-counter medicine.     · If you think your pain medicine is making you sick to your stomach:  ? Take your medicine after meals (unless your doctor has told you not to). ? Ask your doctor for a different pain medicine.     · If your doctor prescribed antibiotics, take them as directed. Do not stop taking them just because you feel better. You need to take the full course of antibiotics. Incision care    · If you have strips of tape on the incision, leave the tape on for a week or until it falls off.     · Wash the area daily with warm, soapy water, and pat it dry. Don't use hydrogen peroxide or alcohol, which can slow healing. You may cover the area with a gauze bandage if it weeps or rubs against clothing. Change the bandage every day.     · Keep the area clean and dry. Other instructions    · If you breastfeed your baby, you may be more comfortable while you are healing if you place the baby so that he or she is not resting on your belly. Try tucking your baby under your arm, with his or her body along the side you will be feeding on.  Support your baby's upper body with your arm. With that hand you can control your baby's head to bring his or her mouth to your breast. This is sometimes called the football hold. Follow-up care is a key part of your treatment and safety. Be sure to make and go to all appointments, and call your doctor if you are having problems. It's also a good idea to know your test results and keep a list of the medicines you take. When should you call for help? Call  911 anytime you think you may need emergency care. For example, call if:    · You have thoughts of harming yourself, your baby, or another person.     · You passed out (lost consciousness).     · You have chest pain, are short of breath, or cough up blood.     · You have a seizure. Call your doctor now or seek immediate medical care if:    · You have pain that does not get better after you take pain medicine.     · You have severe vaginal bleeding.     · You are dizzy or lightheaded, or you feel like you may faint.     · You have new or worse pain in your belly or pelvis.     · You have loose stitches, or your incision comes open.     · You have symptoms of infection, such as:  ? Increased pain, swelling, warmth, or redness. ? Red streaks leading from the incision. ? Pus draining from the incision. ? A fever.     · You have symptoms of a blood clot in your leg (called a deep vein thrombosis), such as:  ? Pain in your calf, back of the knee, thigh, or groin. ? Redness and swelling in your leg or groin.     · You have signs of preeclampsia, such as:  ? Sudden swelling of your face, hands, or feet. ? New vision problems (such as dimness, blurring, or seeing spots). ? A severe headache. Watch closely for changes in your health, and be sure to contact your doctor if:    · You do not get better as expected. Where can you learn more?   Go to http://www.gray.com/  Enter M806 in the search box to learn more about \" Section: What to Expect at Home.\"  Current as of: October 8, 2020               Content Version: 12.8  © 8513-0566 [employer], [facility]. Care instructions adapted under license by [employer] (which disclaims liability or warranty for this information). If you have questions about a medical condition or this instruction, always ask your healthcare professional. [place] [address] any warranty or liability for your use of this information.

## 2021-05-15 NOTE — LACTATION NOTE
Mom called out. Has been trying for nearly 1 hour to get baby to latch. Attempting now. Baby at the breast but not latched. Sleeping. Assisted with latch attempt on both breasts. Helped mom to compress breast tissue and hand express milk. Baby would latch for 2-3 sucks but then come off. Would not stat latched. Very sleepy. Cautioned mom that baby did not need to try x 1 hour at the breast and still not feed. 10% weight loss. Discussed need to pump and feed pumped alex. Mom agreeable. Assisted mom to pump. Reviewed hands on pumping. Mom pumped ~5ml. Fed to baby via curved syringe. Will need to watch milk supply closely. Still limited volume. Breastfeed and pump x 10 minutes every feed. Feed back pumped milk. If baby does not latch well, pump x 15 minutes and feed back pumped milk. Feeding plan given and reviewed. Questions answered.

## 2021-05-15 NOTE — LACTATION NOTE
This note was copied from a baby's chart. Individualized Feeding Plan for Breastfeeding   Lactation Services [phone]      As much as possible, hold your baby on your chest so babys bare skin is against your bare skin with a blanket covering babys back, especially 30 minutes before it is time for baby to eat. Watch for early feeding cues such as, licking lips, sucking motions, rooting, hands to mouth. Crying is a late feeding cue. Feed your baby at least 8 times in 24 hours, or more if your baby is showing feeding cues. If baby is sleepy put baby skin to skin and watch for hunger cues. To rouse baby: unwrap, undress, massage hands, feet, & back, change diaper, gently change babys position from lying to sitting. 15-20 minutes on the first breast of active breastfeeding is considered a good feeding. Good, active breastfeeding is when baby is alert, tugging the nipple, their ear may move, and you can hear swallows. Allow baby to finish the first side before changing sides. Sleeping at the breast or only brief, light sucks should not be considered a good, full breastfeed. At each feeding:  __x__1. Do Suck Practice on finger before each feeding until sucking pattern is smooth. Try using index finger. Nail down towards tongue. __x__2. Hand Express for a few minutes prior to latching to help start milk flow. __x__3. Baby needs to NURSE WELL x 15-20 minutes on at least first breast, burp and offer 2nd breast at every feeding. If no sustained latch only attempt at breast for 10 minutes. If baby does NOTlatch on and feed well on at least one side, you should:   __x__4. Double pump for 15 minutes with breast massage and compression. Hand express for an additional 2-3 minutes per side. Pump after each feeding attempt or poor feeding, up to 8 times per day. If you are not putting baby to the breast you need to pump 8 times a day. Pump every 3 hours. __x__5.  Give baby all of the breast milk you obtain using a straight syringe or  curved syringe. If baby does NOT have enough wet and dirty diapers per day, is jaundiced/lethargic, or has significant weight loss AND you do NOT pump enough milk for each feeding (per volume listed below), formula supplementation may need to be used. Call lactation department /pediatrician if you have concerns. AVERAGE INTAKES OF COLOSTRUM BY HEALTHY  INFANTS:  Time  Day Intake (ml per feeding)  Based on 8 feedings per day. 1st 24 hrs  1 2-10 ml  24-48 hrs  2 5-15 ml  48-72 hrs  3 15-30 ml (0.5-1 oz)  72-96 hrs  4 30-45 ml (1-1.5oz)                          5-6       45-60 ml (1.5-2oz)                           7          60-75ml (2-2.5oz)    By day 7, baby will need 60-75 ml or 2-2.5 oz at each feeding based on 8 feedings per day & babys weight. (1oz = 30ml). Total milk volume needed in 24 hours by Day 7 is 18-20 oz per day based on baby's birthweight of 7-5.5. The more often baby eats, the less volume they need per feeding. If baby is eating more often than the minimum of 8 times per day, they may take less per feeding. Comments: DUE TO weight loss of 10%, need to breastfeed and then pump x 10 minutes and feed back pumped milk each feeding until pediatrician follow up. If pumping, suggest using olive oil or coconut oil on your nipples before pumping to help reduce the friction. Use feeding plan until follow up with pediatrician. Continue to attempt at the breast for most feeds. Pump every 3 hours if no latch. Give all pumped colostrum/breastmilk at each feeding. OUTPATIENT APPOINTMENT Suggested. Outpatient services are located on the 4th floor at [facility]. Check in at the 4th floor registration desk (the same one you used when you came to have your baby).   Call for questions [phone]

## 2021-05-15 NOTE — PROGRESS NOTES
Post-Operative Day Number 3 Progress Note  Lazarus Pacific  [id number]    Patient doing well post-op day 3 from  delivery without significant complaints. Pain controlled on current medication. Voiding without difficulty, normal lochia. Tolerating regular diet    Vitals:    Patient Vitals for the past 8 hrs:   BP Temp Pulse Resp SpO2   05/15/21 0705 116/71 97.8 °F (36.6 °C) 77 17 97 %   05/15/21 0310 138/80 97.7 °F (36.5 °C) 83 16 95 %     Temp (24hrs), Av.9 °F (36.6 °C), Min:97.7 °F (36.5 °C), Max:98.1 °F (36.7 °C)      Vital signs stable, afebrile. Exam:  Patient without distress. Abdomen soft, fundus firm at level of umbilicus, nontender. Incision dry and                      clean without erythema. Labs: No results found for this or any previous visit (from the past 24 hour(s)). Assessment and Plan:  Patient appears to be having uncomplicated post- course. Continue routine post-op care and maternal education. Pt appears hesitant to go home. And i'd love another day to watch her bp's due to her hx chtn- on no meds. Will d/c home halina.

## 2021-05-15 NOTE — PROGRESS NOTES
Bedside report from Danyelle Moff[facility], [facility]. Ready for discharge.

## 2021-05-15 NOTE — DISCHARGE SUMMARY
Obstetrical Discharge Summary     Name: Lazarus Pacific MRN: [id number]  SSN: [id number]    YOB: 1980  Age: 45 y.o. Sex: female      Allergies: Ibuprofen and Pcn [penicillins]    Admit Date: 2021    Discharge Date: 5/15/2021     Admitting Physician: Sae Robledo MD     Attending Physician:  Batsheva Jimenez MD     * Admission Diagnoses: Hypertension affecting pregnancy [O16.9]; Normal labor [O80, Z37.9]    * Discharge Diagnoses:   Information for the patient's :  Jami Felty [[id number]]   Delivery of a 7 lb 5.5 oz (3.33 kg) female infant via , Low Transverse on 2021 at 9:04 PM  by Adina Johnson. Apgars were 9  and 9 . Additional Diagnoses:   Hospital Problems as of 5/15/2021 Date Reviewed: 2021          Codes Class Noted - Resolved POA    Normal labor ICD-10-CM: O80, Z37.9  ICD-9-CM: 936  2021 - Present Unknown        COVID-19 vaccine series completed ICD-10-CM: Z92.29  ICD-9-CM: V87.49  2021 - Present Yes        Large for gestational age fetus affecting management of mother ICD-10-CM: O36.60X0  ICD-9-CM: 656.60  2021 - Present Yes    Overview Addendum 2021  4:39 PM by Batsheva Jimenez MD     - EFW today 73%, symmetric.  AF=21  - repeat at least by 37-38 wks -- 2021 37w4d EFW = 8lb1oz, symmetric (AC=89%, HC = 75%, FL = 95%)             * (Principal) Chronic hypertension affecting pregnancy ICD-10-CM: O10.919  ICD-9-CM: 642.00  10/8/2020 - Present Yes    Overview Addendum 2021  1:43 PM by Batsheva Jimenez MD     - on HCTZ until conception  - Start LDA @ 12 weeks  - follow growth  2020 at [facility]: BP slightly elevated /90, then 130/80. Discusse CHTN in pregnancy and risk or PreE and FGR.    2021 at [facility]: /78 today. No obvious CHTN signs or symptoms. Pt has been checking BP at home and states they have been fine-120/76. Pt c/o retaining fluid.  On exam, trace edema, reassured this was normal.  Told her not to take HCTZ. · Will take BP logs to appts  · 3/29/2021 32w3d GOOD growth, EFW 5-3 (71%) symmetric. AF=23 SDP still about 7. High risk pregnancy, multigravida of advanced maternal age in second trimester ICD-10-CM: O09.522  ICD-9-CM: V23.82  10/8/2020 - Present Yes    Overview Addendum 2021 11:01 AM by Josefina Cutler MD     - patient counseled, plan NIPT @ 10 weeks, refer to MFM for Level II US  2020 at [facility]: Normal NT and nasal bone. LOW RISK NIPT. Genetic counseling done by genetic counseling. NIPT WNL- GIRL!    2021 at [facility]: Normal anatomy and echo; AC 62%, subjectively elevated AF with Deepest Vertical pocket =7.4 cm. Explained may be related to glucose intolerance, recommended decreased carbs. · F/U OB office in 4 weeks for JESICA and growth. · If JESICA still elevated, do GTT at that time. · No [facility] Follow up. RESOLVED: Hypertension affecting pregnancy ICD-10-CM: O16.9  ICD-9-CM: 642.90  2021 - 2021 Unknown        RESOLVED: Gestational hypertension, third trimester ICD-10-CM: O13.3  ICD-9-CM: 642.33  2021 - 2021 Yes             Lab Results   Component Value Date/Time    ABO/Rh(D) B POSITIVE 2021 08:02 PM    Rubella, External Immune 2015    GrBStrep, External POSITIVE/resist to Clindamycin 10/15/2015    ABO,Rh B Positive 2015    There is no immunization history for the selected administration types on file for this patient. * Procedures:   Procedure(s):   SECTION           * Discharge Condition: good    * Hospital Course: Normal hospital course following the delivery. * Disposition: Home    Discharge Medications:   Current Discharge Medication List      START taking these medications    Details   oxyCODONE IR (ROXICODONE) 10 mg tab immediate release tablet Take 1 Tab by mouth every eight (8) hours as needed for Pain for up to 7 days.  Max Daily Amount: 30 mg.  Qty: 24 Tab, Refills: 0  Start date: 5/15/2021, End date: 2021    Associated Diagnoses:  delivery delivered      furosemide (LASIX) 20 mg tablet One po qAM  Qty: 3 Tab, Refills: 0  Start date: 5/15/2021      AMBULATORY BREAST PUMP Use as directed  Qty: 1 Pump, Refills: 0  Start date: 5/15/2021         CONTINUE these medications which have NOT CHANGED    Details   cholecalciferol, vitamin D3, (Vitamin D3) 50 mcg (2,000 unit) tab Take  by mouth.      calcium carbonate (OS-KELLY) 500 mg calcium (1,250 mg) tablet Take  by mouth daily. loratadine (Claritin) 10 mg tablet Take 10 mg by mouth. PNV No.40-Iron Fum-FA Cmb No.1 27-1 mg tab Take  by mouth. STOP taking these medications       ferrous sulfate 325 mg (65 mg iron) tablet Comments:   Reason for Stopping:               * Follow-up Care/Patient Instructions:   Activity: No sex for 6 weeks, No driving while on analgesics and No heavy lifting for 6 weeks  Diet: Regular Diet  Wound Care: As directed    Follow-up Information     Follow up With Specialties Details Why Contact Info    Cheri Connolly MD Obstetrics & Gynecology, Gynecology, Obstetrics Schedule an appointment as soon as possible for a visit in 2 weeks  [address]  [phone]      Unknown, Provider    Patient not available to ask

## 2021-05-15 NOTE — LACTATION NOTE
Lactation visit. In to check on feeds. Mom reports baby latching and feeding well. Reports she feels that breasts are lee. Baby at 10% weight loss. Discussed feeding needs. Feed every 3 hours at least. TETO METZGER [facility] COMPLEX as needed. Recommended that mom pump post nursing for 10 minutes and feed back pumped milk. Mom agreeable. Will rent hospital pump, completed. Questions answered. Has follow up Monday with pediatrician. Offered assistance as needed.

## 2021-05-29 ENCOUNTER — HOSPITAL ENCOUNTER (OUTPATIENT)
Age: 41
Discharge: HOME | End: 2021-05-29
Attending: OBSTETRICS & GYNECOLOGY | Admitting: OBSTETRICS & GYNECOLOGY
Payer: COMMERCIAL

## 2021-05-29 VITALS
DIASTOLIC BLOOD PRESSURE: 79 MMHG | TEMPERATURE: 98 F | WEIGHT: 215 LBS | SYSTOLIC BLOOD PRESSURE: 127 MMHG | BODY MASS INDEX: 31.84 KG/M2 | HEIGHT: 69 IN | HEART RATE: 80 BPM | RESPIRATION RATE: 18 BRPM

## 2021-05-29 PROBLEM — T81.49XA CELLULITIS, WOUND, POST-OPERATIVE: Status: ACTIVE | Noted: 2021-05-29

## 2021-05-29 PROCEDURE — 74011250637 HC RX REV CODE- 250/637: Performed by: OBSTETRICS & GYNECOLOGY

## 2021-05-29 PROCEDURE — 99281 EMR DPT VST MAYX REQ PHY/QHP: CPT

## 2021-05-29 RX ORDER — CLINDAMYCIN HYDROCHLORIDE 150 MG/1
450 CAPSULE ORAL ONCE
Status: COMPLETED | OUTPATIENT
Start: 2021-05-29 | End: 2021-05-29

## 2021-05-29 RX ORDER — DOCUSATE SODIUM 100 MG/1
100 CAPSULE, LIQUID FILLED ORAL DAILY
COMMUNITY
End: 2021-12-07

## 2021-05-29 RX ORDER — CLINDAMYCIN HYDROCHLORIDE 150 MG/1
450 CAPSULE ORAL 3 TIMES DAILY
Qty: 63 CAPSULE | Refills: 0 | Status: SHIPPED | OUTPATIENT
Start: 2021-05-29 | End: 2021-06-05

## 2021-05-29 RX ADMIN — CLINDAMYCIN HYDROCHLORIDE 450 MG: 150 CAPSULE ORAL at 23:55

## 2021-05-30 NOTE — H&P
History & Physical    Name: Diana Metcalf MRN: [id number]  SSN: [id number]    YOB: 1980  Age: 45 y.o. Sex: female        Subjective: Wound concerns  44 yo P3 presents 2 weeks s/p PLTCS on  with concern for wound infection. Pt described her  as emergent. She is only taking tylenol as needed for pain but has noted redness at her incision site starting today. She denies itching at the area of redness. She reports incisional pain with ambulation.       Estimated Date of Delivery: 21  OB History    Para Term  AB Living   4 3 3   1 3   SAB TAB Ectopic Molar Multiple Live Births   1       0 3      # Outcome Date GA Lbr Damián/2nd Weight Sex Delivery Anes PTL Lv   4 Term 21 38w5d  3.33 kg F CS-LTranv EPI N SANDEEP   3 SAB 20           2 Term 10/31/15 39w5d 09:29 / :46 3.61 kg M VAGINAL DELI EPIDURAL AN, EPI N SANDEEP   1 Term 13 40w0d   F Vag-Spont EPI N SANDEEP       Past Medical History:   Diagnosis Date    Hypertension     Nuchal translucency of fetus on prenatal ultrasound 2020    SEE AMA OVERVIEW     Past Surgical History:   Procedure Laterality Date    HX  SECTION  2021    HX WISDOM TEETH EXTRACTION       Social History     Occupational History    Not on file   Tobacco Use    Smoking status: Never Smoker    Smokeless tobacco: Never Used   Substance and Sexual Activity    Alcohol use: Not Currently    Drug use: Not on file    Sexual activity: Not Currently     Partners: Male     Birth control/protection: None     Family History   Problem Relation Age of Onset    Breast Cancer Maternal Grandmother 61    Heart Disease Maternal Grandfather     Heart Disease Paternal Grandfather     Cancer Paternal Grandfather     Hypertension Mother     Ovarian Cancer Neg Hx     Asthma Neg Hx     Bleeding Prob Neg Hx        Allergies   Allergen Reactions    Ibuprofen Swelling    Pcn [Penicillins] Hives     Allergic as a child     Prior to Admission medications    Medication Sig Start Date End Date Taking? Authorizing Provider   docusate sodium (Colace) 100 mg capsule Take 100 mg by mouth daily. Yes Provider, Historical   OTHER    Yes Provider, Historical   cholecalciferol, vitamin D3, (Vitamin D3) 50 mcg (2,000 unit) tab Take  by mouth. Yes Provider, Historical   calcium carbonate (OS-KELLY) 500 mg calcium (1,250 mg) tablet Take  by mouth daily. Yes Provider, Historical   loratadine (Claritin) 10 mg tablet Take 10 mg by mouth. Yes Provider, Historical   PNV No.40-Iron Fum-FA Cmb No.1 27-1 mg tab Take  by mouth. Yes Provider, Historical   norethindrone (MICRONOR) 0.35 mg tab Take 1 Tablet by mouth daily. One tablet PO daily  Patient not taking: Reported on 5/29/2021 5/27/21   Diana Pan MD        Review of Systems: Pertinent items are noted in HPI. 10 point ROS is otherwise negative    Objective:     Vitals:  Vitals:    05/29/21 2244 05/29/21 2251   BP: 127/79    Pulse: 80    Resp: 18    Temp: 98 °F (36.7 °C)    Weight:  97.5 kg (215 lb)   Height:  5' 9\" (1.753 m)        Physical Exam:  Patient without distress. Abdomen: Soft, appropriately tender at incision site, erythematous area starting at incision and extending superiorly. The erythematous area was marked. The incision was intact. No odor or pus was noted. Lower Extremities:  - Edema No      Prenatal Labs:   Lab Results   Component Value Date/Time    ABO/Rh(D) B POSITIVE 05/11/2021 08:02 PM    Rubella, External Immune 03/16/2015 12:00 AM    GrBStrep, External POSITIVE/resist to Clindamycin 10/15/2015 12:00 AM    HBsAg, External Negative 03/16/2015 12:00 AM    HIV, External Negative 03/16/2015 12:00 AM    RPR, External Negative 03/16/2015 12:00 AM    ABO,Rh B Positive 03/16/2015 12:00 AM         Assessment/Plan: POD 17 s/p PLTCS with possible wound cellulitis. Active Problems:    Cellulitis, wound, post-operative (5/29/2021)         Plan:    Will treat for cellulitis with cleocin 450mg po tid. Pt has PCN allergy. Advised in office follow up next week for wound check. Return to triage for fever or expanding redness or wound separation or other concerns.

## 2021-05-30 NOTE — DISCHARGE INSTRUCTIONS
Patient Education        Infection After Surgery: Care Instructions  Your Care Instructions  After surgery, an infection is always possible. It doesn't mean that the surgery didn't go well. Because an infection can be serious, your doctor has taken steps to manage it. Your doctor checked the infection and cleaned it if necessary. He or she may have made an opening in the area so that the pus can drain out. You may have gauze in the cut so that the area will stay open and keep draining. You may need antibiotics. You will need to follow up with your doctor to make sure the infection has gone away. Follow-up care is a key part of your treatment and safety. Be sure to make and go to all appointments, and call your doctor if you are having problems. It's also a good idea to know your test results and keep a list of the medicines you take. How can you care for yourself at home? · Make sure your surgeon knows that you saw a doctor about the infection. · If your doctor prescribed antibiotics, take them as directed. Do not stop taking them just because you feel better. You need to take the full course of antibiotics. · Ask your doctor if you can take an over-the-counter pain medicine, such as acetaminophen (Tylenol), ibuprofen (Advil, Motrin), or naproxen (Aleve). Be safe with medicines. Read and follow all instructions on the label. · Do not take two or more pain medicines at the same time unless the doctor told you to. Many pain medicines have acetaminophen, which is Tylenol. Too much acetaminophen (Tylenol) can be harmful. · Prop up the area on a pillow anytime you sit or lie down during the next 3 days. Try to keep it above the level of your heart. This will help reduce swelling. · Keep the skin clean and dry. · If you have a bandage, keep it clean and dry. · You may have a dressing over the cut (incision). A dressing helps the incision heal and protects it.  Your doctor will tell you how to take care of this. You can expect drainage from the wound. · If your doctor told you how to care for your incision, follow your doctor's instructions. If you did not get instructions, follow this general advice:  ? Wash around the incision with clean water 2 times a day. Don't use hydrogen peroxide or alcohol, which can slow healing. When should you call for help? Call your doctor now or seek immediate medical care if:    · You have signs that your infection is getting worse, such as:  ? Increased pain, swelling, warmth, or redness in the area. ? Red streaks leading from the area. ? Pus draining from the wound. ? A new or higher fever. Watch closely for changes in your health, and be sure to contact your doctor if you have any problems. Where can you learn more? Go to http://www.gray.com/  Enter C340 in the search box to learn more about \"Infection After Surgery: Care Instructions. \"  Current as of: February 26, 2020               Content Version: 12.8  © 2006-2021 [employer]. Care instructions adapted under license by [employer] (which disclaims liability or warranty for this information). If you have questions about a medical condition or this instruction, always ask your healthcare professional. [place] [address] any warranty or liability for your use of this information.

## 2021-05-30 NOTE — PROGRESS NOTES
Dr. Loza Dub in room with patient. Wound evaluated and redened area above wound marked with pen. Patient informed to call to be seen in office this week to recheck. Patient informed to watch red area for expansion. Patient will be given first dose of antibiotic here and script to be sent to pharmacy. Patient cleared to be discharged home after first dose of medication.

## 2021-05-30 NOTE — PROGRESS NOTES
Medication given PO as per order. Discharge instructions reviewed and signed by patient. Patient discharged home in stable condition.

## 2021-06-01 PROBLEM — Z92.29 COVID-19 VACCINE SERIES COMPLETED: Status: RESOLVED | Noted: 2021-04-28 | Resolved: 2021-06-01

## 2021-06-01 PROBLEM — F41.9 ANXIETY DURING PREGNANCY IN FIRST TRIMESTER, ANTEPARTUM: Status: RESOLVED | Noted: 2020-11-16 | Resolved: 2021-06-01

## 2021-06-01 PROBLEM — O10.919 CHRONIC HYPERTENSION AFFECTING PREGNANCY: Status: RESOLVED | Noted: 2020-10-08 | Resolved: 2021-06-01

## 2021-06-01 PROBLEM — O36.60X0 LARGE FOR GESTATIONAL AGE FETUS AFFECTING MANAGEMENT OF MOTHER: Status: RESOLVED | Noted: 2021-02-04 | Resolved: 2021-06-01

## 2021-06-01 PROBLEM — O99.341 ANXIETY DURING PREGNANCY IN FIRST TRIMESTER, ANTEPARTUM: Status: RESOLVED | Noted: 2020-11-16 | Resolved: 2021-06-01

## 2021-06-01 PROBLEM — O09.522 HIGH RISK PREGNANCY, MULTIGRAVIDA OF ADVANCED MATERNAL AGE IN SECOND TRIMESTER: Status: RESOLVED | Noted: 2020-10-08 | Resolved: 2021-06-01

## 2021-12-07 PROBLEM — T81.49XA CELLULITIS, WOUND, POST-OPERATIVE: Status: RESOLVED | Noted: 2021-05-29 | Resolved: 2021-12-07

## 2021-12-07 PROBLEM — Z37.9 NORMAL LABOR: Status: RESOLVED | Noted: 2021-05-12 | Resolved: 2021-12-07

## 2022-03-19 PROBLEM — N39.3 SUI (STRESS URINARY INCONTINENCE, FEMALE): Status: ACTIVE | Noted: 2021-01-12

## 2022-03-29 NOTE — PROGRESS NOTES
Pt to room 439 for triage with chief complaint of possible infection in  scar. Pt c/o discomfort and redness to area. Dr Bear Luz called to assess patient. Home

## 2022-10-13 ENCOUNTER — HOSPITAL ENCOUNTER (OUTPATIENT)
Dept: MAMMOGRAPHY | Age: 42
Discharge: HOME | End: 2022-10-16
Payer: COMMERCIAL

## 2022-10-13 DIAGNOSIS — Z12.31 VISIT FOR SCREENING MAMMOGRAM: ICD-10-CM

## 2022-10-13 PROCEDURE — 77063 BREAST TOMOSYNTHESIS BI: CPT

## 2023-03-15 ENCOUNTER — OFFICE VISIT (OUTPATIENT)
Dept: OBGYN CLINIC | Age: 43
End: 2023-03-15
Payer: COMMERCIAL

## 2023-03-15 VITALS
BODY MASS INDEX: 30.07 KG/M2 | WEIGHT: 203 LBS | SYSTOLIC BLOOD PRESSURE: 138 MMHG | HEIGHT: 69 IN | DIASTOLIC BLOOD PRESSURE: 90 MMHG

## 2023-03-15 DIAGNOSIS — Z12.4 SCREENING FOR CERVICAL CANCER: ICD-10-CM

## 2023-03-15 DIAGNOSIS — Z01.419 WELL WOMAN EXAM WITH ROUTINE GYNECOLOGICAL EXAM: Primary | ICD-10-CM

## 2023-03-15 DIAGNOSIS — N39.3 SUI (STRESS URINARY INCONTINENCE, FEMALE): ICD-10-CM

## 2023-03-15 DIAGNOSIS — Z12.31 ENCOUNTER FOR SCREENING MAMMOGRAM FOR MALIGNANT NEOPLASM OF BREAST: ICD-10-CM

## 2023-03-15 PROCEDURE — G8484 FLU IMMUNIZE NO ADMIN: HCPCS | Performed by: OBSTETRICS & GYNECOLOGY

## 2023-03-15 PROCEDURE — 99396 PREV VISIT EST AGE 40-64: CPT | Performed by: OBSTETRICS & GYNECOLOGY

## 2023-03-15 SDOH — ECONOMIC STABILITY: INCOME INSECURITY: HOW HARD IS IT FOR YOU TO PAY FOR THE VERY BASICS LIKE FOOD, HOUSING, MEDICAL CARE, AND HEATING?: NOT HARD AT ALL

## 2023-03-15 SDOH — ECONOMIC STABILITY: FOOD INSECURITY: WITHIN THE PAST 12 MONTHS, YOU WORRIED THAT YOUR FOOD WOULD RUN OUT BEFORE YOU GOT MONEY TO BUY MORE.: NEVER TRUE

## 2023-03-15 SDOH — ECONOMIC STABILITY: FOOD INSECURITY: WITHIN THE PAST 12 MONTHS, THE FOOD YOU BOUGHT JUST DIDN'T LAST AND YOU DIDN'T HAVE MONEY TO GET MORE.: NEVER TRUE

## 2023-03-15 SDOH — ECONOMIC STABILITY: HOUSING INSECURITY
IN THE LAST 12 MONTHS, WAS THERE A TIME WHEN YOU DID NOT HAVE A STEADY PLACE TO SLEEP OR SLEPT IN A SHELTER (INCLUDING NOW)?: NO

## 2023-03-15 ASSESSMENT — ENCOUNTER SYMPTOMS
EYES NEGATIVE: 1
ABDOMINAL PAIN: 0
RESPIRATORY NEGATIVE: 1
SHORTNESS OF BREATH: 0
BLOOD IN STOOL: 0
ALLERGIC/IMMUNOLOGIC NEGATIVE: 1
COUGH: 0
GASTROINTESTINAL NEGATIVE: 1

## 2023-03-15 ASSESSMENT — PATIENT HEALTH QUESTIONNAIRE - PHQ9
SUM OF ALL RESPONSES TO PHQ QUESTIONS 1-9: 0
SUM OF ALL RESPONSES TO PHQ QUESTIONS 1-9: 0
1. LITTLE INTEREST OR PLEASURE IN DOING THINGS: 0
SUM OF ALL RESPONSES TO PHQ QUESTIONS 1-9: 0
2. FEELING DOWN, DEPRESSED OR HOPELESS: 0
SUM OF ALL RESPONSES TO PHQ9 QUESTIONS 1 & 2: 0
SUM OF ALL RESPONSES TO PHQ QUESTIONS 1-9: 0

## 2023-03-15 NOTE — PROGRESS NOTES
Sebastián Hung is 40 y.o. female, No obstetric history on file., who presents today for a routine annual gynecological examination. Patient's last menstrual period was 2023 (exact date). . C/o severe and worsening DANNY, frequently can completely soak clothing. Can even soak through \"period underwear. \"  Is taking HCTZ for HTN since her last delivery about 2 yrs ago. Mammogram/Pap Hx 3/15/2023   Mammogram Date 10/13/2022   Mammogram Result neg   Pap Date 2021   Pap Result neg     GYN Intake Questionnaire 3/15/2023   Current Form of Contraception Vasectomy   Menarche 14   Period Cycle 28-30   Period Duration in Days 4   Period Pattern Regular   Menstrual Flow Moderate   Period Control Thin Pad;Tampon Regular   Frequency of Change for Flow Control 3 x d   Dysmenorrhea None   Post-Coital Bleeding None   Date of Mammogram 10/13/2022   Result of Mammogram neg   Date of Pap 2021   Pap result neg       Contraception: vasectomy  Has received Gardisil: [address] never   Last time cholesterol was checked: unsure    OB History:   OB History    Para Term  AB Living   4 3 3   1 3   SAB IAB Ectopic Molar Multiple Live Births   1         3      # Outcome Date GA Lbr Vickey/2nd Weight Sex Delivery Anes PTL Lv   4 Term 21 38w5d  7 lb 5.5 oz (3.33 kg) F CS-LTranv EPI N PERLITA   3 SAB 20           2 Term 10/31/15 39w5d 09:29 / 01:46 7 lb 15.3 oz (3.61 kg) M  EPI N PERLITA   1 Term 13 40w0d   F Vag-Spont EPI N PERLITA         Health Maintenance Due   Topic Date Due    Varicella vaccine (1 of 2 - 2-dose childhood series) Never done    COVID-19 Vaccine (3 - Booster for Pfizer series) 06/15/2021    Lipids  Never done    Depression Screen  2022    Flu vaccine (1) Never done         GYN History            aPul Mcfarlane  has a past medical history of Hypertension.  .    Her surgeries include  has a past surgical history that includes [place] tooth extraction and  section (05/12/2021). .    Allergies   Allergen Reactions    Ibuprofen Swelling    Penicillins Hives     Allergic as a child        Her current meds are:   Current Outpatient Medications   Medication Sig    hydroCHLOROthiazide (HYDRODIURIL) 25 MG tablet Take 25 mg by mouth daily    loratadine (CLARITIN) 10 MG tablet Take 10 mg by mouth    polyethylene glycol (GLYCOLAX) 17 GM/SCOOP powder Take 17 g by mouth daily (Patient not taking: Reported on 3/15/2023)     No current facility-administered medications for this visit. Family history is significant for family history includes Breast Cancer (age of onset: 61) in her maternal grandmother; Cancer in her paternal grandfather; Heart Disease in her maternal grandfather and paternal grandfather; Hypertension in her mother. Maureen Loredo  reports that she has never smoked. She has never used smokeless tobacco. She reports current alcohol use. She reports that she does not use drugs. She completed her Systems Review which is documented below. Any positive systems not related to Gyn are recommended to discuss with her PCP. Review of Systems   Constitutional: Negative. Negative for unexpected weight change. HENT: Negative. Eyes: Negative. Respiratory: Negative. Negative for cough and shortness of breath. Cardiovascular: Negative. Negative for chest pain and palpitations. Gastrointestinal: Negative. Negative for abdominal pain and blood in stool. Endocrine: Negative. Genitourinary:  Positive for enuresis, frequency and urgency. Negative for dysuria, menstrual problem and pelvic pain. Musculoskeletal: Negative. Skin: Negative. Allergic/Immunologic: Negative. Neurological: Negative. Hematological: Negative. Psychiatric/Behavioral: Negative. Negative for behavioral problems and confusion. All other systems reviewed and are negative. No results found for this visit on 03/15/23.    Blood pressure (!) 138/90, height 5' 9\" (1.753 m), weight 203 lb (92.1 kg), last menstrual period 03/08/2023. Body mass index is 29.98 kg/m². Wt Readings from Last 3 Encounters:   03/15/23 203 lb (92.1 kg)   12/07/21 213 lb (96.6 kg)   06/08/21 216 lb (98 kg)      Physical Exam  Vitals and nursing note reviewed. Exam conducted with a chaperone present. Constitutional:       General: She is not in acute distress. Appearance: Normal appearance. She is not toxic-appearing. HENT:      Head: Normocephalic and atraumatic. Eyes:      Extraocular Movements: Extraocular movements intact. Pupils: Pupils are equal, round, and reactive to light. Pulmonary:      Effort: Pulmonary effort is normal. No respiratory distress. Chest:   Breasts:     Breasts are symmetrical.      Right: Normal. No bleeding, inverted nipple, mass, nipple discharge or skin change. Left: Normal. No bleeding, inverted nipple, mass, nipple discharge or skin change. Abdominal:      General: Abdomen is flat. There is no distension. Palpations: Abdomen is soft. There is no mass. Tenderness: There is no abdominal tenderness. There is no guarding or rebound. Hernia: No hernia is present. There is no hernia in the left inguinal area or right inguinal area. Genitourinary:     General: Normal vulva. Exam position: Lithotomy position. Pubic Area: No rash. Labia:         Right: No rash, tenderness or lesion. Left: No rash, tenderness or lesion. Vagina: Normal.      Cervix: Normal.      Uterus: Normal. Not enlarged and not tender. Adnexa: Right adnexa normal and left adnexa normal.        Right: No mass. Left: No mass. Comments: Minimal relaxation even with valsalva  Musculoskeletal:         General: Normal range of motion. Cervical back: Normal range of motion and neck supple. Lymphadenopathy:      Upper Body:      Right upper body: No axillary adenopathy. Left upper body: No axillary adenopathy. Skin:     General: Skin is warm and dry. Neurological:      General: No focal deficit present. Mental Status: She is alert and oriented to person, place, and time. Psychiatric:         Mood and Affect: Mood normal.         Behavior: Behavior normal.         Thought Content: Thought content normal.         Judgment: Judgment normal.        Assessment/plan: Rod Gant" was seen today for Annual Exam (Pt reporting mild abd tenderness/bloating around time of ovulation. ) and Incontinence (Pt still experiencing urinary incontinence/leakage. Completed pelvic floor therapy. )       Shelia Pretty was seen today for annual exam and incontinence. Diagnoses and all orders for this visit:    Well woman exam with routine gynecological exam - ow NL exam  -     PAP LB, Reflex HPV ASCUS    Encounter for screening mammogram for malignant neoplasm of breast  -     LUBA JUSTUS DIGITAL SCREEN BILATERAL; Future    Screening for cervical cancer  -     PAP LB, Reflex HPV ASCUS    DANNY (stress urinary incontinence, female) - needs full eval. Has already had PT, rec refer to [address] [place] [address].   In the meantime STOP HCTZ, check with PCP for a different antihypertensive.  -     [facility] - Lucy Moy DO, UrogynecologyAraseli       Return in about 1 year (around 3/15/2024) for Annual.

## 2023-03-21 LAB
CYTOLOGIST CVX/VAG CYTO: NORMAL
CYTOLOGY CVX/VAG DOC THIN PREP: NORMAL
HPV REFLEX: NORMAL
Lab: NORMAL
Lab: NORMAL
PATH REPORT.FINAL DX SPEC: NORMAL
STAT OF ADQ CVX/VAG CYTO-IMP: NORMAL

## 2023-05-04 ENCOUNTER — OFFICE VISIT (OUTPATIENT)
Dept: UROGYNECOLOGY | Age: 43
End: 2023-05-04
Payer: COMMERCIAL

## 2023-05-04 VITALS — WEIGHT: 201 LBS | BODY MASS INDEX: 29.77 KG/M2 | HEIGHT: 69 IN

## 2023-05-04 DIAGNOSIS — N39.3 SUI (STRESS URINARY INCONTINENCE, FEMALE): Primary | ICD-10-CM

## 2023-05-04 PROCEDURE — G8427 DOCREV CUR MEDS BY ELIG CLIN: HCPCS | Performed by: OBSTETRICS & GYNECOLOGY

## 2023-05-04 PROCEDURE — G8419 CALC BMI OUT NRM PARAM NOF/U: HCPCS | Performed by: OBSTETRICS & GYNECOLOGY

## 2023-05-04 PROCEDURE — 1036F TOBACCO NON-USER: CPT | Performed by: OBSTETRICS & GYNECOLOGY

## 2023-05-04 PROCEDURE — 51701 INSERT BLADDER CATHETER: CPT | Performed by: OBSTETRICS & GYNECOLOGY

## 2023-05-04 PROCEDURE — 99204 OFFICE O/P NEW MOD 45 MIN: CPT | Performed by: OBSTETRICS & GYNECOLOGY

## 2023-05-04 NOTE — PROGRESS NOTES
[facility] UROGYNECOLOGY  [address]  Dept: [phone]        PCP:  RAPHAEL Manrique    5/4/2023      HPI:  I am being asked to see this patient in consultation by Dr. Davina Kunz   for New Patient  . Ms. Gina Verdugo  has been leaking urine for 7 years since son was born. She leaks urine during day. She does leak urine with cough, laugh, sneeze and activity. She does leak urine with urgency. She uses Thinx panties and goes through 2. She leaks 10 times per day. When she leaks she leaks small-large amounts depending on activities. She has tried PT with Katlin Hernandes with [facility] and [address] about 3 years ago, she has not tried medication. She has not had procedures/surgery for this in the past.     With both urge incontinence and stress incontinence, Stress incontinence bothers her the most.          She voids 12 times during the day. She voids 3 times over night. She has 14 BM per week, and does not strain. She drinks 3 caffeine drinks beverages per day. She uses 3 artificial sweeteners per day. She drinks 3 alcoholic beverages per week. She has not pelvic surgery in the past.   Her last PAP: 2023    Her last Colonoscopy: N/A  Her last Mammogram: 2022    She does not have a history of DM. No results found for: LABA1C  No results found for: EAG    She does not have a history of sleep apnea. Tobacco: No    Sexual History: sexually active with partner    Notes were reviewed from the referring provider Dr Davina Kunz. Results were reviewed from prior tests:     No results found for this visit on 05/04/23. Ht 5' 9\" (1.753 m)   Wt 201 lb (91.2 kg)   BMI 29.68 kg/m²     Physical Exam  Vitals reviewed. Exam conducted with a chaperone present. Constitutional:       General: She is not in acute distress. Appearance: Normal appearance. She is normal weight. HENT:      Head: Normocephalic and atraumatic.

## 2023-05-04 NOTE — ASSESSMENT & PLAN NOTE
We discussed the differential diagnosis of urinary incontinence. We also discussed the pathogenesis and etiology of stress urinary incontinence. I explained the epidemiology of incontinence. I offered her options which include nothing, physical therapy, barrier treatment, and surgery. She needs to cut out bladder irritants. She is s/p PT but no longer does HEP. I offered this again. She would like to have a procedure for this. We discussed slings v bulking today in detail. Info was sent home today.

## 2023-05-05 ENCOUNTER — TELEPHONE (OUTPATIENT)
Dept: OBGYN CLINIC | Age: 43
End: 2023-05-05

## 2023-05-05 DIAGNOSIS — N39.3 SUI (STRESS URINARY INCONTINENCE, FEMALE): Primary | ICD-10-CM

## 2023-05-05 NOTE — TELEPHONE ENCOUNTER
Pt saw Dr. Alok Mack yesterday and would like to be referred to a different urologist due to conflict of interest. She would like to be referred to [facility] at [facility]. Please advise.

## 2024-12-03 ENCOUNTER — TRANSCRIBE ORDERS (OUTPATIENT)
Dept: SCHEDULING | Age: 44
End: 2024-12-03

## 2024-12-03 DIAGNOSIS — Z12.31 OTHER SCREENING MAMMOGRAM: Primary | ICD-10-CM

## 2025-02-04 ENCOUNTER — HOSPITAL ENCOUNTER (OUTPATIENT)
Dept: MAMMOGRAPHY | Age: 45
Discharge: HOME | End: 2025-02-07
Attending: OBSTETRICS & GYNECOLOGY
Payer: COMMERCIAL

## 2025-02-04 VITALS — BODY MASS INDEX: 26.22 KG/M2 | WEIGHT: 173 LBS | HEIGHT: 68 IN

## 2025-02-04 DIAGNOSIS — Z12.31 OTHER SCREENING MAMMOGRAM: ICD-10-CM

## 2025-02-04 PROCEDURE — 77067 SCR MAMMO BI INCL CAD: CPT

## (undated) DEVICE — CARDINAL HEALTH FLEXIBLE LIGHT HANDLE COVER: Brand: CARDINAL HEALTH

## (undated) DEVICE — REM POLYHESIVE ADULT PATIENT RETURN ELECTRODE: Brand: VALLEYLAB

## (undated) DEVICE — SUTURE MCRYL SZ 4-0 L27IN ABSRB UD L19MM PS-2 1/2 CIR PRIM Y426H

## (undated) DEVICE — DRAPE,UNDERBUTTOCKS,PCH,STERILE: Brand: MEDLINE

## (undated) DEVICE — PREP SKN DURAPREP 26ML APPL --

## (undated) DEVICE — SURGICAL PROCEDURE PACK C SECT CDS

## (undated) DEVICE — MEDI-VAC NON-CONDUCTIVE SUCTION TUBING: Brand: CARDINAL HEALTH

## (undated) DEVICE — PENCIL ES L3M BTTN SWCH S STL HEX LOK BLDE ELECTRD HOLSTER

## (undated) DEVICE — CYSTO: Brand: MEDLINE INDUSTRIES, INC.

## (undated) DEVICE — STERILE POLYISOPRENE POWDER-FREE SURGICAL GLOVES: Brand: PROTEXIS

## (undated) DEVICE — TRAY PREP DRY W/ PREM GLV 2 APPL 6 SPNG 2 UNDPD 1 OVERWRAP

## (undated) DEVICE — U.V.A.C. SWIVEL HANDLE W/TUBING, 6': Brand: CONVERTORS

## (undated) DEVICE — SPONGE LAP 18X18IN STRL -- 5/PK

## (undated) DEVICE — DRAPE TWL SURG 16X26IN BLU ORB04] ALLCARE INC]

## (undated) DEVICE — KENDALL SCD EXPRESS SLEEVES, KNEE LENGTH, MEDIUM: Brand: KENDALL SCD

## (undated) DEVICE — PAD MATERNITY 11IN W/TAILS -- STRL

## (undated) DEVICE — TUBING, SUCTION, 1/4" X 10', STRAIGHT: Brand: MEDLINE

## (undated) DEVICE — CONTAINER SPEC HISTOLOGY 900ML POLYPR

## (undated) DEVICE — APPLICATOR BNDG 1MM ADH PREMIERPRO EXOFIN

## (undated) DEVICE — SOLUTION IV 1000ML 0.9% SOD CHL

## (undated) DEVICE — SOLUTION IRRIG 1000ML H2O STRL BLT

## (undated) DEVICE — CATH FOL TY IC BAG 16FR 2000ML -- CONVERT TO ITEM 363158

## (undated) DEVICE — SUT CHRMC 1 36IN CTX BRN --

## (undated) DEVICE — SUTURE PLN GUT SZ 2-0 L27IN ABSRB YELLOWISH TAN L40MM CT 853H

## (undated) DEVICE — GOWN,REINF,POLY,ECL,PP SLV,XL: Brand: MEDLINE

## (undated) DEVICE — Z DISCONTINUED USE 2659133 CANNULA VAC DIA8MM CRV SEMI RIG W/ ROUNDED TIP TAPR END

## (undated) DEVICE — SUT CHRMC 1 27IN CT1 BRN --